# Patient Record
Sex: MALE | Race: WHITE | NOT HISPANIC OR LATINO | Employment: OTHER | URBAN - METROPOLITAN AREA
[De-identification: names, ages, dates, MRNs, and addresses within clinical notes are randomized per-mention and may not be internally consistent; named-entity substitution may affect disease eponyms.]

---

## 2019-08-08 ENCOUNTER — OFFICE VISIT (OUTPATIENT)
Dept: GASTROENTEROLOGY | Facility: CLINIC | Age: 79
End: 2019-08-08
Payer: MEDICARE

## 2019-08-08 VITALS
SYSTOLIC BLOOD PRESSURE: 140 MMHG | HEART RATE: 72 BPM | DIASTOLIC BLOOD PRESSURE: 84 MMHG | BODY MASS INDEX: 25.91 KG/M2 | WEIGHT: 171 LBS | HEIGHT: 68 IN

## 2019-08-08 DIAGNOSIS — K21.9 GASTROESOPHAGEAL REFLUX DISEASE WITHOUT ESOPHAGITIS: Primary | ICD-10-CM

## 2019-08-08 DIAGNOSIS — Z12.11 COLON CANCER SCREENING: ICD-10-CM

## 2019-08-08 PROCEDURE — 99213 OFFICE O/P EST LOW 20 MIN: CPT | Performed by: INTERNAL MEDICINE

## 2019-08-08 RX ORDER — PANTOPRAZOLE SODIUM 20 MG/1
20 TABLET, DELAYED RELEASE ORAL 2 TIMES DAILY
Qty: 180 TABLET | Refills: 12 | Status: SHIPPED | OUTPATIENT
Start: 2019-08-08 | End: 2020-08-17 | Stop reason: SDUPTHER

## 2019-08-08 RX ORDER — ESCITALOPRAM OXALATE 10 MG/1
10 TABLET ORAL DAILY
COMMUNITY

## 2019-08-08 RX ORDER — AMLODIPINE BESYLATE 5 MG/1
5 TABLET ORAL DAILY
COMMUNITY

## 2019-08-08 RX ORDER — PANTOPRAZOLE SODIUM 20 MG/1
20 TABLET, DELAYED RELEASE ORAL 2 TIMES DAILY
COMMUNITY
End: 2019-08-08 | Stop reason: SDUPTHER

## 2019-08-08 RX ORDER — ASPIRIN 81 MG/1
81 TABLET ORAL DAILY
COMMUNITY

## 2019-08-08 NOTE — PROGRESS NOTES
[address] [facility] - Outpatient Follow-up Note  Niko Pope [de-identified] y o  male MRN: [id number]  Encounter: [id number]    ASSESSMENT AND PLAN:      1  Gastroesophageal reflux disease without esophagitis  Feels better taking 40 mg of pantoprazole daily rather than 20 mg twice a day  Last EGD 2013  - pantoprazole (PROTONIX) 20 mg tablet; Take 1 tablet (20 mg total) by mouth 2 (two) times a day  Dispense: 180 tablet; Refill: 12    2  Colon cancer screening  Last colonoscopy 2011  Followup Appointment:  1 year  ______________________________________________________________________    Chief Complaint   Patient presents with    Routine follow up-GERD     HPI:  Patient returns today for follow-up from a GERD standpoint  He is doing well  He was taking pantoprazole 20 mg twice a day but thinks he feels better taking 40 mg once a day  He denies any dysphagia, odynophagia, or early satiety  Denies any heartburn or indigestion  His bowels are normal   His weight is stable  Her cardiac standpoint he is stable    From prostate cancer standpoint he is concerned this PSA is increasing in May need to go back on medication    Historical Information   Past Medical History:   Diagnosis Date    Colon cancer screening 8/8/2019    Last colonoscopy 2011    Coronary artery disease     GERD (gastroesophageal reflux disease)     Last EGD 2013    Hypertension     Prostate cancer [facility])      Past Surgical History:   Procedure Laterality Date    CORONARY ARTERY BYPASS GRAFT  01/2015    EGD  10/16/2013    PROSTATECTOMY      SKIN CANCER EXCISION       Social History     Substance and Sexual Activity   Alcohol Use Yes     Social History     Substance and Sexual Activity   Drug Use Not on file     Social History     Tobacco Use   Smoking Status Never Smoker   Smokeless Tobacco Never Used     Family History   Problem Relation Age of Onset    Breast cancer Sister     Parkinsonism Sister     Colon polyps Neg Hx     Colon cancer Neg Hx          Current Outpatient Medications:     amLODIPine (NORVASC) 5 mg tablet    aspirin (ECOTRIN LOW STRENGTH) 81 mg EC tablet    escitalopram (LEXAPRO) 10 mg tablet    pantoprazole (PROTONIX) 20 mg tablet  No Known Allergies    10 Point REVIEW OF SYSTEMS IS OTHERWISE NEGATIVE  PHYSICAL EXAM:    Blood pressure 140/84, pulse 72, height 5' 8" (1 727 m), weight 77 6 kg (171 lb)  Body mass index is 26 kg/m²  General Appearance:  Alert, cooperative, no distress  HEENT:  Normocephalic, atraumatic, anicteric  Neck: Supple, symmetrical, trachea midline  Lungs: Clear to auscultation bilaterally; no rales, rhonchi or wheezing; respirations unlabored   Heart: Regular rate and rhythm; no murmur, rub, or gallop  Abdomen:   Soft, non-tender, non-distended; normal bowel sounds; no masses, no organomegaly   Rectal:  Deferred   Extremities:  No cyanosis, clubbing or edema   Skin:  No jaundice, rashes, or lesions   Lymph nodes: No palpable cervical lymphadenopathy     Lab Results:   None recent     Radiology Results:   No results found

## 2019-08-08 NOTE — LETTER
August 8, 2019     Arletta Schaumann, [address] [facility] [address]  [address] [facility]    Patient: Maryanne Guillen   YOB: 1940   Date of Visit: 8/8/2019       Dear Dr Titus Dance: Thank you for referring Maryanne Guillen to me for evaluation  Below are my notes for this consultation  If you have questions, please do not hesitate to call me  I look forward to following your patient along with you  Sincerely,        Shireen Good MD        CC: MD Shireen Ca MD  8/8/2019  4:30 PM  Incomplete  [facility] [employer] [facility] - Outpatient Follow-up [facility]  Maryanne Guillen [de-identified] y o  male MRN: [id number]  Encounter: [id number]    ASSESSMENT AND PLAN:      1  Gastroesophageal reflux disease without esophagitis  Feels better taking 40 mg of pantoprazole daily rather than 20 mg twice a day  Last EGD 2013  - pantoprazole (PROTONIX) 20 mg tablet; Take 1 tablet (20 mg total) by mouth 2 (two) times a day  Dispense: 180 tablet; Refill: 12    2  Colon cancer screening  Last colonoscopy 2011  Followup Appointment:  1 year  ______________________________________________________________________    Chief Complaint   Patient presents with    Routine follow up-GERD     HPI:  Patient returns today for follow-up from a GERD standpoint  He is doing well  He was taking pantoprazole 20 mg twice a day but thinks he feels better taking 40 mg once a day  He denies any dysphagia, odynophagia, or early satiety  Denies any heartburn or indigestion  His bowels are normal   His weight is stable  Her cardiac standpoint he is stable    From prostate cancer standpoint he is concerned this PSA is increasing in May need to go back on medication    Historical Information   Past Medical History:   Diagnosis Date    Colon cancer screening 8/8/2019    Last colonoscopy 2011    Coronary artery disease     GERD (gastroesophageal reflux disease)     Last EGD 2013    Hypertension  Prostate cancer [facility])      Past Surgical History:   Procedure Laterality Date    CORONARY ARTERY BYPASS GRAFT  01/2015    EGD  10/16/2013    PROSTATECTOMY      SKIN CANCER EXCISION       Social History     Substance and Sexual Activity   Alcohol Use Yes     Social History     Substance and Sexual Activity   Drug Use Not on file     Social History     Tobacco Use   Smoking Status Never Smoker   Smokeless Tobacco Never Used     Family History   Problem Relation Age of Onset    Breast cancer Sister     Parkinsonism Sister     Colon polyps Neg Hx     Colon cancer Neg Hx          Current Outpatient Medications:     amLODIPine (NORVASC) 5 mg tablet    aspirin (ECOTRIN LOW STRENGTH) 81 mg EC tablet    escitalopram (LEXAPRO) 10 mg tablet    pantoprazole (PROTONIX) 20 mg tablet  No Known Allergies    10 Point REVIEW OF SYSTEMS IS OTHERWISE NEGATIVE  PHYSICAL EXAM:    Blood pressure 140/84, pulse 72, height 5' 8" (1 727 m), weight 77 6 kg (171 lb)  Body mass index is 26 kg/m²  General Appearance:  Alert, cooperative, no distress  HEENT:  Normocephalic, atraumatic, anicteric  Neck: Supple, symmetrical, trachea midline  Lungs: Clear to auscultation bilaterally; no rales, rhonchi or wheezing; respirations unlabored   Heart: Regular rate and rhythm; no murmur, rub, or gallop  Abdomen:   Soft, non-tender, non-distended; normal bowel sounds; no masses, no organomegaly   Rectal:  Deferred   Extremities:  No cyanosis, clubbing or edema   Skin:  No jaundice, rashes, or lesions   Lymph nodes: No palpable cervical lymphadenopathy     Lab Results:   None recent     Radiology Results:   No results found

## 2020-08-17 ENCOUNTER — OFFICE VISIT (OUTPATIENT)
Dept: GASTROENTEROLOGY | Facility: CLINIC | Age: 80
End: 2020-08-17
Payer: MEDICARE

## 2020-08-17 VITALS
BODY MASS INDEX: 24.1 KG/M2 | HEART RATE: 70 BPM | WEIGHT: 159 LBS | TEMPERATURE: 97.5 F | DIASTOLIC BLOOD PRESSURE: 68 MMHG | SYSTOLIC BLOOD PRESSURE: 126 MMHG | HEIGHT: 68 IN

## 2020-08-17 DIAGNOSIS — I25.10 CORONARY ARTERY DISEASE INVOLVING NATIVE CORONARY ARTERY OF NATIVE HEART WITHOUT ANGINA PECTORIS: ICD-10-CM

## 2020-08-17 DIAGNOSIS — Z12.11 COLON CANCER SCREENING: ICD-10-CM

## 2020-08-17 DIAGNOSIS — K21.9 GASTROESOPHAGEAL REFLUX DISEASE WITHOUT ESOPHAGITIS: Primary | ICD-10-CM

## 2020-08-17 PROCEDURE — 99213 OFFICE O/P EST LOW 20 MIN: CPT | Performed by: INTERNAL MEDICINE

## 2020-08-17 RX ORDER — ATORVASTATIN CALCIUM 80 MG/1
80 TABLET, FILM COATED ORAL DAILY
COMMUNITY

## 2020-08-17 RX ORDER — CLOPIDOGREL BISULFATE 75 MG/1
75 TABLET ORAL DAILY
COMMUNITY
End: 2021-09-10

## 2020-08-17 RX ORDER — PANTOPRAZOLE SODIUM 20 MG/1
20 TABLET, DELAYED RELEASE ORAL 2 TIMES DAILY
Qty: 180 TABLET | Refills: 12 | Status: SHIPPED | OUTPATIENT
Start: 2020-08-17 | End: 2021-09-10

## 2020-08-17 RX ORDER — TEMAZEPAM 7.5 MG/1
15 CAPSULE ORAL
COMMUNITY

## 2020-08-17 RX ORDER — METOPROLOL SUCCINATE 25 MG/1
25 TABLET, EXTENDED RELEASE ORAL DAILY
COMMUNITY

## 2020-08-17 RX ORDER — BICALUTAMIDE 50 MG/1
50 TABLET, FILM COATED ORAL DAILY
COMMUNITY

## 2020-08-17 RX ORDER — ISOSORBIDE MONONITRATE 30 MG/1
30 TABLET, EXTENDED RELEASE ORAL DAILY
COMMUNITY

## 2020-08-17 NOTE — LETTER
August 17, 2020     Saima Robert, [address] [facility] [address] [facility]    Patient: Marilynn Reaves   YOB: 1940   Date of Visit: 8/17/2020       Dear Dr Kiara Hua: Thank you for referring Marilynn Reaves to me for evaluation  Below are my notes for this consultation  If you have questions, please do not hesitate to call me  I look forward to following your patient along with you  Sincerely,        Bird Lang MD        CC: MD Bird Duarte MD  8/17/2020 11:47 AM  Sign when Signing Visit  [address] [facility] - Outpatient Follow-up Note  Marilynn Reaves 79 y o  male MRN: [id number]  Encounter: [id number]    ASSESSMENT AND PLAN:      1  Gastroesophageal reflux disease without esophagitis  Doing well taking pantoprazole two 20 mg pills in the morning   - pantoprazole (PROTONIX) 20 mg tablet; Take 1 tablet (20 mg total) by mouth 2 (two) times a day  Dispense: 180 tablet; Refill: 12    2  Colon cancer screening  Last colonoscopy 2011  Can discuss the pros and cons of repeating next year     3  Coronary artery disease involving native coronary artery of native heart without angina pectoris  History of lad stent in the past   Recent MI in May  Treating medically      Followup Appointment:  1 year  ______________________________________________________________________    Chief Complaint   Patient presents with    Follow-up     GERD     HPI:  The patient returns today for follow-up  He reports in the spring had an episode of chest pain was diagnosed with a a myocardial infarction  Catheterization showed small vessel disease with nothing amenable to stenting  He has been medically treated  From a GERD standpoint he is doing well  When I last saw him he switched his pantoprazole from 20 mg b i d  To 40 mg once a day  He likes having to 20 mg pills in case he needs to take an extra 1 if he has breakthrough symptoms at night  However with the pantoprazole he denies any heartburn or reflux  Denies any dysphagia, odynophagia, early satiety  He denies any GI bleeding  His bowels are normal except when he eats too much fruit/vegetables and then they become a little loose    Historical Information   Past Medical History:   Diagnosis Date    Colon cancer screening 8/8/2019    Last colonoscopy 2011    Coronary artery disease     GERD (gastroesophageal reflux disease)     Last EGD 2013    Hypertension     Prostate cancer [facility])      Past Surgical History:   Procedure Laterality Date    CORONARY ARTERY BYPASS GRAFT  01/2015    EGD  10/16/2013    PROSTATECTOMY      SKIN CANCER EXCISION       Social History     Substance and Sexual Activity   Alcohol Use Yes     Social History     Substance and Sexual Activity   Drug Use Not on file     Social History     Tobacco Use   Smoking Status Never Smoker   Smokeless Tobacco Never Used     Family History   Problem Relation Age of Onset    Breast cancer Sister     Parkinsonism Sister     Colon polyps Neg Hx     Colon cancer Neg Hx          Current Outpatient Medications:     amLODIPine (NORVASC) 5 mg tablet    aspirin (ECOTRIN LOW STRENGTH) 81 mg EC tablet    atorvastatin (LIPITOR) 80 mg tablet    bicalutamide (CASODEX) 50 mg tablet    clopidogrel (PLAVIX) 75 mg tablet    escitalopram (LEXAPRO) 10 mg tablet    isosorbide mononitrate (IMDUR) 30 mg 24 hr tablet    metoprolol succinate (TOPROL-XL) 25 mg 24 hr tablet    pantoprazole (PROTONIX) 20 mg tablet    temazepam (RESTORIL) 7 5 mg capsule  No Known Allergies  Reviewed medications and allergies and updated as indicated    PHYSICAL EXAM:    Blood pressure 126/68, pulse 70, temperature 97 5 °F (36 4 °C), height 5' 8" (1 727 m), weight 72 1 kg (159 lb)  Body mass index is 24 18 kg/m²  General Appearance: NAD, cooperative, alert  Eyes: Anicteric, PERRLA, EOMI  ENT:  Normocephalic, atraumatic, normal mucosa      Neck:  Supple, symmetrical, trachea midline  Resp:  Clear to auscultation bilaterally; no rales, rhonchi or wheezing; respirations unlabored   CV:  S1 S2, Regular rate and rhythm; no murmur, rub, or gallop  GI:  Soft, non-tender, non-distended; normal bowel sounds; no masses, no organomegaly   Rectal: Deferred  Musculoskeletal: No cyanosis, clubbing or edema  Normal ROM  Skin:  No jaundice, rashes, or lesions   Heme/Lymph: No palpable cervical lymphadenopathy  Psych: Normal affect, good eye contact  Neuro: No gross deficits, AAOx3    Lab Results:   None recently    Radiology Results:   No results found

## 2020-08-17 NOTE — PROGRESS NOTES
[facility] - Outpatient Follow-up Note  Carmen Levin 79 y o  male MRN: [id number]  Encounter: [id number]    ASSESSMENT AND PLAN:      1  Gastroesophageal reflux disease without esophagitis  Doing well taking pantoprazole two 20 mg pills in the morning   - pantoprazole (PROTONIX) 20 mg tablet; Take 1 tablet (20 mg total) by mouth 2 (two) times a day  Dispense: 180 tablet; Refill: 12    2  Colon cancer screening  Last colonoscopy 2011  Can discuss the pros and cons of repeating next year     3  Coronary artery disease involving native coronary artery of native heart without angina pectoris  History of lad stent in the past   Recent MI in May  Treating medically      Followup Appointment:  1 year  ______________________________________________________________________    Chief Complaint   Patient presents with    Follow-up     GERD     HPI:  The patient returns today for follow-up  He reports in the spring had an episode of chest pain was diagnosed with a a myocardial infarction  Catheterization showed small vessel disease with nothing amenable to stenting  He has been medically treated  From a GERD standpoint he is doing well  When I last saw him he switched his pantoprazole from 20 mg b i d  To 40 mg once a day  He likes having to 20 mg pills in case he needs to take an extra 1 if he has breakthrough symptoms at night  However with the pantoprazole he denies any heartburn or reflux  Denies any dysphagia, odynophagia, early satiety  He denies any GI bleeding    His bowels are normal except when he eats too much fruit/vegetables and then they become a little loose    Historical Information   Past Medical History:   Diagnosis Date    Colon cancer screening 8/8/2019    Last colonoscopy 2011    Coronary artery disease     GERD (gastroesophageal reflux disease)     Last EGD 2013    Hypertension     Prostate cancer [facility])      Past Surgical History:   Procedure Laterality Date    CORONARY ARTERY BYPASS GRAFT  01/2015    EGD  10/16/2013    PROSTATECTOMY      SKIN CANCER EXCISION       Social History     Substance and Sexual Activity   Alcohol Use Yes     Social History     Substance and Sexual Activity   Drug Use Not on file     Social History     Tobacco Use   Smoking Status Never Smoker   Smokeless Tobacco Never Used     Family History   Problem Relation Age of Onset    Breast cancer Sister     Parkinsonism Sister     Colon polyps Neg Hx     Colon cancer Neg Hx          Current Outpatient Medications:     amLODIPine (NORVASC) 5 mg tablet    aspirin (ECOTRIN LOW STRENGTH) 81 mg EC tablet    atorvastatin (LIPITOR) 80 mg tablet    bicalutamide (CASODEX) 50 mg tablet    clopidogrel (PLAVIX) 75 mg tablet    escitalopram (LEXAPRO) 10 mg tablet    isosorbide mononitrate (IMDUR) 30 mg 24 hr tablet    metoprolol succinate (TOPROL-XL) 25 mg 24 hr tablet    pantoprazole (PROTONIX) 20 mg tablet    temazepam (RESTORIL) 7 5 mg capsule  No Known Allergies  Reviewed medications and allergies and updated as indicated    PHYSICAL EXAM:    Blood pressure 126/68, pulse 70, temperature 97 5 °F (36 4 °C), height 5' 8" (1 727 m), weight 72 1 kg (159 lb)  Body mass index is 24 18 kg/m²  General Appearance: NAD, cooperative, alert  Eyes: Anicteric, PERRLA, EOMI  ENT:  Normocephalic, atraumatic, normal mucosa  Neck:  Supple, symmetrical, trachea midline  Resp:  Clear to auscultation bilaterally; no rales, rhonchi or wheezing; respirations unlabored   CV:  S1 S2, Regular rate and rhythm; no murmur, rub, or gallop  GI:  Soft, non-tender, non-distended; normal bowel sounds; no masses, no organomegaly   Rectal: Deferred  Musculoskeletal: No cyanosis, clubbing or edema  Normal ROM    Skin:  No jaundice, rashes, or lesions   Heme/Lymph: No palpable cervical lymphadenopathy  Psych: Normal affect, good eye contact  Neuro: No gross deficits, AAOx3    Lab Results:   None recently    Radiology Results:   No results found

## 2021-09-10 ENCOUNTER — OFFICE VISIT (OUTPATIENT)
Dept: GASTROENTEROLOGY | Facility: CLINIC | Age: 81
End: 2021-09-10
Payer: MEDICARE

## 2021-09-10 VITALS
HEIGHT: 68 IN | DIASTOLIC BLOOD PRESSURE: 60 MMHG | WEIGHT: 163 LBS | BODY MASS INDEX: 24.71 KG/M2 | SYSTOLIC BLOOD PRESSURE: 126 MMHG | HEART RATE: 59 BPM

## 2021-09-10 DIAGNOSIS — K21.9 GASTROESOPHAGEAL REFLUX DISEASE WITHOUT ESOPHAGITIS: Primary | ICD-10-CM

## 2021-09-10 DIAGNOSIS — Z12.11 COLON CANCER SCREENING: ICD-10-CM

## 2021-09-10 PROCEDURE — 99213 OFFICE O/P EST LOW 20 MIN: CPT | Performed by: INTERNAL MEDICINE

## 2021-09-10 RX ORDER — PANTOPRAZOLE SODIUM 40 MG/1
40 TABLET, DELAYED RELEASE ORAL DAILY
Qty: 90 TABLET | Refills: 5 | Status: SHIPPED | OUTPATIENT
Start: 2021-09-10

## 2021-09-10 NOTE — LETTER
September 10, 2021     Nirmal Ugalde, [address] [facility] [address]  [address] [facility]    Patient: Kenny Nino   YOB: 1940   Date of Visit: 9/10/2021       Dear Dr Joseph Doran: Thank you for referring Kenny Nino to me for evaluation  Below are my notes for this consultation  If you have questions, please do not hesitate to call me  I look forward to following your patient along with you  Sincerely,        Thaddeus Soto MD        CC: No Recipients  Thaddeus Soto MD  9/10/2021  9:03 AM  Sign when Signing Visit  [address] [facility] - Outpatient Follow-up Note  Kenny Nino 79 y o  male MRN: [id number]  Encounter: [id number]    ASSESSMENT AND PLAN:      1  Gastroesophageal reflux disease without esophagitis  Doing well on Protonix  Now taking  40 mg in the morning rather 20 mg twice a day  - pantoprazole (PROTONIX) 40 mg tablet; Take 1 tablet (40 mg total) by mouth daily  Dispense: 90 tablet; Refill: 5    2  Colon cancer screening  Last colonoscopy 2011  No plans to repeat secondary to age      Followup Appointment:   1 year  ______________________________________________________________________    Chief Complaint   Patient presents with    Annual Exam     GERD     HPI:   Patient is seen back in the office today  He is doing overall well from a GI and cardiac standpoint  He is off his Plavix now  He has been taking his Protonix 40 mg in the morning  He was taking 20 mg twice a day but was having trouble remembering taking the 2nd dose  He denies any heartburn or indigestion  Denies any dysphagia, odynophagia, early satiety  He is moving his bowels regularly  His weight is stable    He sold his house in his the process of moving to The [employer] of [address] [place] [address]   Past Medical History:   Diagnosis Date    Colon cancer screening 8/8/2019    Last colonoscopy 2011    Coronary artery disease     GERD (gastroesophageal reflux disease)     Last EGD 2013    Hypertension     Prostate cancer [facility])      Past Surgical History:   Procedure Laterality Date    COLONOSCOPY      CORONARY ARTERY BYPASS GRAFT  01/2015    EGD  10/16/2013    PROSTATECTOMY      SKIN CANCER EXCISION       Social History     Substance and Sexual Activity   Alcohol Use Yes     Social History     Substance and Sexual Activity   Drug Use Not on file     Social History     Tobacco Use   Smoking Status Never Smoker   Smokeless Tobacco Never Used     Family History   Problem Relation Age of Onset    Breast cancer Sister     Parkinsonism Sister     Colon polyps Neg Hx     Colon cancer Neg Hx          Current Outpatient Medications:     amLODIPine (NORVASC) 5 mg tablet    aspirin (ECOTRIN LOW STRENGTH) 81 mg EC tablet    atorvastatin (LIPITOR) 80 mg tablet    bicalutamide (CASODEX) 50 mg tablet    escitalopram (LEXAPRO) 10 mg tablet    isosorbide mononitrate (IMDUR) 30 mg 24 hr tablet    metoprolol succinate (TOPROL-XL) 25 mg 24 hr tablet    temazepam (RESTORIL) 7 5 mg capsule    pantoprazole (PROTONIX) 40 mg tablet  No Known Allergies  Reviewed medications and allergies and updated as indicated    PHYSICAL EXAM:    Blood pressure 126/60, pulse 59, height 5' 8" (1 727 m), weight 73 9 kg (163 lb)  Body mass index is 24 78 kg/m²  General Appearance: NAD, cooperative, alert  Eyes: Anicteric, PERRLA, EOMI  ENT:  Normocephalic, atraumatic, normal mucosa  Neck:  Supple, symmetrical, trachea midline  Resp:  Clear to auscultation bilaterally; no rales, rhonchi or wheezing; respirations unlabored   CV:  S1 S2, Regular rate and rhythm; no murmur, rub, or gallop  GI:  Soft, non-tender, non-distended; normal bowel sounds; no masses, no organomegaly   Rectal: Deferred  Musculoskeletal: No cyanosis, clubbing or edema  Normal ROM    Skin:  No jaundice, rashes, or lesions   Heme/Lymph: No palpable cervical lymphadenopathy  Psych: Normal affect, good eye contact  Neuro: No gross deficits, AAOx3    Lab Results:   None recently     Radiology Results:   No results found

## 2021-09-10 NOTE — PROGRESS NOTES
[address] [facility] - Outpatient Follow-up Note  Everardo Crews 79 y o  male MRN: [id number]  Encounter: [id number]    ASSESSMENT AND PLAN:      1  Gastroesophageal reflux disease without esophagitis  Doing well on Protonix  Now taking  40 mg in the morning rather 20 mg twice a day  - pantoprazole (PROTONIX) 40 mg tablet; Take 1 tablet (40 mg total) by mouth daily  Dispense: 90 tablet; Refill: 5    2  Colon cancer screening  Last colonoscopy 2011  No plans to repeat secondary to age      Followup Appointment:   1 year  ______________________________________________________________________    Chief Complaint   Patient presents with    Annual Exam     GERD     HPI:   Patient is seen back in the office today  He is doing overall well from a GI and cardiac standpoint  He is off his Plavix now  He has been taking his Protonix 40 mg in the morning  He was taking 20 mg twice a day but was having trouble remembering taking the 2nd dose  He denies any heartburn or indigestion  Denies any dysphagia, odynophagia, early satiety  He is moving his bowels regularly  His weight is stable    He sold his house in his the process of moving to The [employer] of [address] [place] [address]   Past Medical History:   Diagnosis Date    Colon cancer screening 8/8/2019    Last colonoscopy 2011    Coronary artery disease     GERD (gastroesophageal reflux disease)     Last EGD 2013    Hypertension     Prostate cancer [facility])      Past Surgical History:   Procedure Laterality Date    COLONOSCOPY      CORONARY ARTERY BYPASS GRAFT  01/2015    EGD  10/16/2013    PROSTATECTOMY      SKIN CANCER EXCISION       Social History     Substance and Sexual Activity   Alcohol Use Yes     Social History     Substance and Sexual Activity   Drug Use Not on file     Social History     Tobacco Use   Smoking Status Never Smoker   Smokeless Tobacco Never Used     Family History   Problem Relation Age of Onset    Breast cancer Sister     Parkinsonism Sister     Colon polyps Neg Hx     Colon cancer Neg Hx          Current Outpatient Medications:     amLODIPine (NORVASC) 5 mg tablet    aspirin (ECOTRIN LOW STRENGTH) 81 mg EC tablet    atorvastatin (LIPITOR) 80 mg tablet    bicalutamide (CASODEX) 50 mg tablet    escitalopram (LEXAPRO) 10 mg tablet    isosorbide mononitrate (IMDUR) 30 mg 24 hr tablet    metoprolol succinate (TOPROL-XL) 25 mg 24 hr tablet    temazepam (RESTORIL) 7 5 mg capsule    pantoprazole (PROTONIX) 40 mg tablet  No Known Allergies  Reviewed medications and allergies and updated as indicated    PHYSICAL EXAM:    Blood pressure 126/60, pulse 59, height 5' 8" (1 727 m), weight 73 9 kg (163 lb)  Body mass index is 24 78 kg/m²  General Appearance: NAD, cooperative, alert  Eyes: Anicteric, PERRLA, EOMI  ENT:  Normocephalic, atraumatic, normal mucosa  Neck:  Supple, symmetrical, trachea midline  Resp:  Clear to auscultation bilaterally; no rales, rhonchi or wheezing; respirations unlabored   CV:  S1 S2, Regular rate and rhythm; no murmur, rub, or gallop  GI:  Soft, non-tender, non-distended; normal bowel sounds; no masses, no organomegaly   Rectal: Deferred  Musculoskeletal: No cyanosis, clubbing or edema  Normal ROM  Skin:  No jaundice, rashes, or lesions   Heme/Lymph: No palpable cervical lymphadenopathy  Psych: Normal affect, good eye contact  Neuro: No gross deficits, AAOx3    Lab Results:   None recently     Radiology Results:   No results found

## 2022-09-15 ENCOUNTER — OFFICE VISIT (OUTPATIENT)
Dept: GASTROENTEROLOGY | Facility: CLINIC | Age: 82
End: 2022-09-15
Payer: MEDICARE

## 2022-09-15 VITALS
HEIGHT: 68 IN | SYSTOLIC BLOOD PRESSURE: 114 MMHG | BODY MASS INDEX: 25.31 KG/M2 | DIASTOLIC BLOOD PRESSURE: 62 MMHG | WEIGHT: 167 LBS | HEART RATE: 78 BPM

## 2022-09-15 DIAGNOSIS — Z12.11 COLON CANCER SCREENING: ICD-10-CM

## 2022-09-15 DIAGNOSIS — K21.9 GASTROESOPHAGEAL REFLUX DISEASE WITHOUT ESOPHAGITIS: Primary | ICD-10-CM

## 2022-09-15 DIAGNOSIS — K58.0 IRRITABLE BOWEL SYNDROME WITH DIARRHEA: ICD-10-CM

## 2022-09-15 PROCEDURE — 99213 OFFICE O/P EST LOW 20 MIN: CPT | Performed by: INTERNAL MEDICINE

## 2022-09-15 RX ORDER — PANTOPRAZOLE SODIUM 40 MG/1
40 TABLET, DELAYED RELEASE ORAL DAILY
Qty: 90 TABLET | Refills: 5 | Status: SHIPPED | OUTPATIENT
Start: 2022-09-15

## 2022-09-15 RX ORDER — DICYCLOMINE HYDROCHLORIDE 10 MG/1
10 CAPSULE ORAL EVERY 6 HOURS PRN
Qty: 10 CAPSULE | Refills: 5 | Status: SHIPPED | OUTPATIENT
Start: 2022-09-15

## 2022-09-15 NOTE — LETTER
September 15, 2022     Jailyn Mayers, [address] [facility] [address]  [address] [facility]    Patient: Batsheva Mcduffie   YOB: 1940   Date of Visit: 9/15/2022       Dear Dr Zeeshan Felix: Thank you for referring Batsheva Mcduffie to me for evaluation  Below are my notes for this consultation  If you have questions, please do not hesitate to call me  I look forward to following your patient along with you  Sincerely,        Ashley Juares MD        CC: MD Ashley Richards MD  9/15/2022  9:36 AM  Sign when Signing Visit  [facility] - Outpatient Follow-up Note  Batsheva Mcduffie 79 y o  male MRN: [id number]  Encounter: [id number]    ASSESSMENT AND PLAN:      1  Irritable bowel syndrome with diarrhea  Recent episode of crampy abdominal pain associated with diarrhea and nausea and vomiting  He believes it is flare of his irritable bowel syndrome  Admits he is under lot of stress at home  Can not completely exclude an episode of infectious gastroenteritis with nausea/vomiting, abdominal pain, and diarrhea  Improved at this point  - dicyclomine (BENTYL) 10 mg capsule; Take 1 capsule (10 mg total) by mouth every 6 (six) hours as needed (Abdominal cramps)  Dispense: 10 capsule; Refill: 5  - okay to use Imodium as needed    2  Gastroesophageal reflux disease without esophagitis  Doing well on pantoprazole once a day  - pantoprazole (PROTONIX) 40 mg tablet; Take 1 tablet (40 mg total) by mouth daily  Dispense: 90 tablet; Refill: 5    3  Colon cancer screening  Last colonoscopy 2011  No plans to recall secondary to age       Followup Appointment:  1 year  ______________________________________________________________________    Chief Complaint   Patient presents with    Yearly follow up-GERD     Is currently having issues with abd  Cramping and diarrhea       HPI:  Patient is seen back in the office today    From a GERD standpoint he is doing well   Continues take Protonix 40 mg daily and denies any significant heartburn or indigestion  Denies any dysphagia, odynophagia, early satiety  He reports earlier in the week had episodes of crampy abdominal pain associated diarrhea and nausea/vomiting  This lasted for several days  He backed office diet and seems to be getting better  He believes is related to his irritable bowel syndrome  He denies any GI bleeding  He denies any fevers or chills  Denies any exposure to anybody else sick  He is reluctant to take Imodium could he does not want to be constipated  He reports he has had similar episodes to this through the years which he attributes to his IBS    He does admit that he is under lot of stress as he is concerned about his wife's failing health    Historical Information   Past Medical History:   Diagnosis Date    Colon cancer screening 8/8/2019    Last colonoscopy 2011    Coronary artery disease     GERD (gastroesophageal reflux disease)     Last EGD 2013    Hypertension     Prostate cancer [facility])      Past Surgical History:   Procedure Laterality Date    COLONOSCOPY      CORONARY ARTERY BYPASS GRAFT  01/2015    EGD  10/16/2013    PROSTATECTOMY      SKIN CANCER EXCISION       Social History     Substance and Sexual Activity   Alcohol Use Not Currently    Comment: Social only     Social History     Substance and Sexual Activity   Drug Use Never     Social History     Tobacco Use   Smoking Status Never Smoker   Smokeless Tobacco Never Used     Family History   Problem Relation Age of Onset    Breast cancer Sister     Parkinsonism Sister     Colon polyps Neg Hx     Colon cancer Neg Hx          Current Outpatient Medications:     amLODIPine (NORVASC) 5 mg tablet    aspirin (ECOTRIN LOW STRENGTH) 81 mg EC tablet    atorvastatin (LIPITOR) 80 mg tablet    bicalutamide (CASODEX) 50 mg tablet    dicyclomine (BENTYL) 10 mg capsule    escitalopram (LEXAPRO) 10 mg tablet    isosorbide mononitrate (IMDUR) 30 mg 24 hr tablet    metoprolol succinate (TOPROL-XL) 25 mg 24 hr tablet    pantoprazole (PROTONIX) 40 mg tablet    temazepam (RESTORIL) 7 5 mg capsule  No Known Allergies  Reviewed medications and allergies and updated as indicated    PHYSICAL EXAM:    Blood pressure 114/62, pulse 78, height 5' 8" (1 727 m), weight 75 8 kg (167 lb)  Body mass index is 25 39 kg/m²  General Appearance: NAD, cooperative, alert  Eyes: Anicteric, PERRLA, EOMI  ENT:  Normocephalic, atraumatic, normal mucosa  Neck:  Supple, symmetrical, trachea midline  Resp:  Clear to auscultation bilaterally; no rales, rhonchi or wheezing; respirations unlabored   CV:  S1 S2, Regular rate and rhythm; no murmur, rub, or gallop  GI:  Soft, non-tender, non-distended; normal bowel sounds; no masses, no organomegaly   Rectal: Deferred  Musculoskeletal: No cyanosis, clubbing or edema  Normal ROM  Skin:  No jaundice, rashes, or lesions   Heme/Lymph: No palpable cervical lymphadenopathy  Psych: Normal affect, good eye contact  Neuro: No gross deficits, AAOx3    Lab Results:   None recently     Radiology Results:   No results found  Answers for HPI/ROS submitted by the patient on 9/8/2022  Chronicity: recurrent  Onset quality: gradual  Frequency: intermittently  Episode duration: 51473 hours  Progression since onset: resolved  Pain location: generalized abdominal region  Pain - numeric: 3/10  Pain quality: cramping  Radiates to: suprapubic region, perineum  anorexia: No  arthralgias: No  belching: No  constipation: No  diarrhea: Yes  dysuria: No  fever: No  flatus: Yes  frequency: No  hematochezia: No  hematuria: No  melena: No  myalgias: No  nausea:  No  weight loss: No  vomiting: No  Aggravated by: nothing  Relieved by: certain positions  Diagnostic workup: lower endoscopy

## 2022-09-15 NOTE — PROGRESS NOTES
[facility] - Outpatient Follow-up Note  Kayli Finley 79 y o  male MRN: [id number]  Encounter: [id number]    ASSESSMENT AND PLAN:      1  Irritable bowel syndrome with diarrhea  Recent episode of crampy abdominal pain associated with diarrhea and nausea and vomiting  He believes it is flare of his irritable bowel syndrome  Admits he is under lot of stress at home  Can not completely exclude an episode of infectious gastroenteritis with nausea/vomiting, abdominal pain, and diarrhea  Improved at this point  - dicyclomine (BENTYL) 10 mg capsule; Take 1 capsule (10 mg total) by mouth every 6 (six) hours as needed (Abdominal cramps)  Dispense: 10 capsule; Refill: 5  - okay to use Imodium as needed    2  Gastroesophageal reflux disease without esophagitis  Doing well on pantoprazole once a day  - pantoprazole (PROTONIX) 40 mg tablet; Take 1 tablet (40 mg total) by mouth daily  Dispense: 90 tablet; Refill: 5    3  Colon cancer screening  Last colonoscopy 2011  No plans to recall secondary to age       Followup Appointment:  1 year  ______________________________________________________________________    Chief Complaint   Patient presents with    Yearly follow up-GERD     Is currently having issues with abd  Cramping and diarrhea       HPI:  Patient is seen back in the office today  From a GERD standpoint he is doing well  Continues take Protonix 40 mg daily and denies any significant heartburn or indigestion  Denies any dysphagia, odynophagia, early satiety  He reports earlier in the week had episodes of crampy abdominal pain associated diarrhea and nausea/vomiting  This lasted for several days  He backed office diet and seems to be getting better  He believes is related to his irritable bowel syndrome  He denies any GI bleeding  He denies any fevers or chills  Denies any exposure to anybody else sick    He is reluctant to take Imodium could he does not want to be constipated  He reports he has had similar episodes to this through the years which he attributes to his IBS  He does admit that he is under lot of stress as he is concerned about his wife's failing health    Historical Information   Past Medical History:   Diagnosis Date    Colon cancer screening 8/8/2019    Last colonoscopy 2011    Coronary artery disease     GERD (gastroesophageal reflux disease)     Last EGD 2013    Hypertension     Prostate cancer [facility])      Past Surgical History:   Procedure Laterality Date    COLONOSCOPY      CORONARY ARTERY BYPASS GRAFT  01/2015    EGD  10/16/2013    PROSTATECTOMY      SKIN CANCER EXCISION       Social History     Substance and Sexual Activity   Alcohol Use Not Currently    Comment: Social only     Social History     Substance and Sexual Activity   Drug Use Never     Social History     Tobacco Use   Smoking Status Never Smoker   Smokeless Tobacco Never Used     Family History   Problem Relation Age of Onset    Breast cancer Sister     Parkinsonism Sister     Colon polyps Neg Hx     Colon cancer Neg Hx          Current Outpatient Medications:     amLODIPine (NORVASC) 5 mg tablet    aspirin (ECOTRIN LOW STRENGTH) 81 mg EC tablet    atorvastatin (LIPITOR) 80 mg tablet    bicalutamide (CASODEX) 50 mg tablet    dicyclomine (BENTYL) 10 mg capsule    escitalopram (LEXAPRO) 10 mg tablet    isosorbide mononitrate (IMDUR) 30 mg 24 hr tablet    metoprolol succinate (TOPROL-XL) 25 mg 24 hr tablet    pantoprazole (PROTONIX) 40 mg tablet    temazepam (RESTORIL) 7 5 mg capsule  No Known Allergies  Reviewed medications and allergies and updated as indicated    PHYSICAL EXAM:    Blood pressure 114/62, pulse 78, height 5' 8" (1 727 m), weight 75 8 kg (167 lb)  Body mass index is 25 39 kg/m²  General Appearance: NAD, cooperative, alert  Eyes: Anicteric, PERRLA, EOMI  ENT:  Normocephalic, atraumatic, normal mucosa      Neck:  Supple, symmetrical, trachea midline  Resp: Clear to auscultation bilaterally; no rales, rhonchi or wheezing; respirations unlabored   CV:  S1 S2, Regular rate and rhythm; no murmur, rub, or gallop  GI:  Soft, non-tender, non-distended; normal bowel sounds; no masses, no organomegaly   Rectal: Deferred  Musculoskeletal: No cyanosis, clubbing or edema  Normal ROM  Skin:  No jaundice, rashes, or lesions   Heme/Lymph: No palpable cervical lymphadenopathy  Psych: Normal affect, good eye contact  Neuro: No gross deficits, AAOx3    Lab Results:   None recently     Radiology Results:   No results found  Answers for HPI/ROS submitted by the patient on 9/8/2022  Chronicity: recurrent  Onset quality: gradual  Frequency: intermittently  Episode duration: 77006 hours  Progression since onset: resolved  Pain location: generalized abdominal region  Pain - numeric: 3/10  Pain quality: cramping  Radiates to: suprapubic region, perineum  anorexia: No  arthralgias: No  belching: No  constipation: No  diarrhea: Yes  dysuria: No  fever: No  flatus: Yes  frequency: No  hematochezia: No  hematuria: No  melena: No  myalgias: No  nausea:  No  weight loss: No  vomiting: No  Aggravated by: nothing  Relieved by: certain positions  Diagnostic workup: lower endoscopy

## 2022-09-15 NOTE — PATIENT INSTRUCTIONS
Continue pantoprazole once a day  Use Bentyl as needed for abdominal cramps and spasms    Follow-up office visit 1 year

## 2023-07-12 ENCOUNTER — NURSE TRIAGE (OUTPATIENT)
Age: 83
End: 2023-07-12

## 2023-07-12 NOTE — TELEPHONE ENCOUNTER
Reason for Disposition  • Diarrhea is a chronic symptom (recurrent or ongoing AND lasting > 4 weeks)    Protocols used: DIARRHEA-ADULT-OH     I spoke with patient, he has experienced similar episodes in the past, most noticeable when eating onions (which he avoids as best as he can). Symptoms seem to be occurring more frequently but intermittent. He does have dicyclomine and Imodium but he states once he has explosive BM after abdominal cramping he has no reason to take the Imodium. Symptoms usually occur 1-2 hours after eating, no matter what he ingests. He was diagnosed with CLL in March of this year and follows with [place]. No new medications, no recent antibiotics, no sick contacts. He is currently scheduled for follow up in September but requests earlier (on waitlist). He is leaving on vacation in 2 weeks. I advised liquid diet or BRAT, increasing fiber (foods or supplement), starting Pepto Bismol 1-2 tabs up to QID (noted stool may darken in color). Please review and advise. If any orders [facility] on file confirmed.

## 2023-07-12 NOTE — TELEPHONE ENCOUNTER
Agree with recommendations as below. Continue to avoid trigger foods. Keeping food diary may be helpful to identify other potential triggers. He may continue to use imodium as needed as long as diarrhea is non-bloody and no worsening abdominal pain. Thanks!

## 2023-07-12 NOTE — TELEPHONE ENCOUNTER
Regarding: explosive diarrhea, cramping  ----- Message from Theresa Donato sent at 7/12/2023 11:07 AM EDT -----  Patient of Dr. Viktoriya Underwood, he is experiencing cramping and explosive diarrhea when he eats.  Patient has been scheduled for the first available at [facility] on 9/14

## 2023-07-13 NOTE — TELEPHONE ENCOUNTER
I spoke with patient and reviewed note to keep food diary, continue meds as instructed but if blood noted in stool or increasing abd pain patient should stop Imodium and call the office.

## 2023-07-19 ENCOUNTER — TELEPHONE (OUTPATIENT)
Dept: GASTROENTEROLOGY | Facility: CLINIC | Age: 83
End: 2023-07-19

## 2023-07-19 NOTE — TELEPHONE ENCOUNTER
Left a voice mail about moving his appt up from 09/14 to 07/21 with Dr Kike Cardoso, in [facility], at 4:30 pm

## 2023-07-19 NOTE — TELEPHONE ENCOUNTER
Patient returned a call from someone in the office regarding an availability for 7- but will keep the initial appt scheduled 9-

## 2023-10-16 ENCOUNTER — OFFICE VISIT (OUTPATIENT)
Dept: GASTROENTEROLOGY | Facility: CLINIC | Age: 83
End: 2023-10-16
Payer: MEDICARE

## 2023-10-16 VITALS
HEART RATE: 73 BPM | HEIGHT: 68 IN | WEIGHT: 173.2 LBS | DIASTOLIC BLOOD PRESSURE: 79 MMHG | BODY MASS INDEX: 26.25 KG/M2 | SYSTOLIC BLOOD PRESSURE: 136 MMHG

## 2023-10-16 DIAGNOSIS — K21.9 GASTROESOPHAGEAL REFLUX DISEASE WITHOUT ESOPHAGITIS: ICD-10-CM

## 2023-10-16 DIAGNOSIS — Z12.11 COLON CANCER SCREENING: ICD-10-CM

## 2023-10-16 DIAGNOSIS — K58.0 IRRITABLE BOWEL SYNDROME WITH DIARRHEA: Primary | ICD-10-CM

## 2023-10-16 PROCEDURE — 99213 OFFICE O/P EST LOW 20 MIN: CPT | Performed by: INTERNAL MEDICINE

## 2023-10-16 RX ORDER — DICYCLOMINE HYDROCHLORIDE 10 MG/1
10 CAPSULE ORAL EVERY 6 HOURS PRN
Qty: 30 CAPSULE | Refills: 5 | Status: SHIPPED | OUTPATIENT
Start: 2023-10-16

## 2023-10-16 RX ORDER — PANTOPRAZOLE SODIUM 40 MG/1
40 TABLET, DELAYED RELEASE ORAL DAILY
Qty: 90 TABLET | Refills: 5 | Status: SHIPPED | OUTPATIENT
Start: 2023-10-16

## 2023-10-16 NOTE — LETTER
October 16, 2023     [address]  [address] [facility]    Patient: Stefani Lee   YOB: 1940   Date of Visit: 10/16/2023       Dear Dr. Governor Baird: Thank you for referring Stefani Lee to me for evaluation. Below are my notes for this consultation. If you have questions, please do not hesitate to call me. I look forward to following your patient along with you. Sincerely,        Janel Minor. Carmenza Delgado MD        CC: Fredrich Lye, DO Rik Blizzard, MD Maritza Roes. Carmenza Delgado MD  10/16/2023 10:09 AM  Sign when Signing Visit  [facility] - Outpatient Follow-up Note  Stefani Lee 79 y.o. male MRN: [id number]  Encounter: [id number]    ASSESSMENT AND PLAN:      1. Irritable bowel syndrome with diarrhea  Intermittent crampy abdominal pain associate with diarrhea. Under a lot of stress secondary to social situations. Bentyl seems to be helpful when he takes it  - dicyclomine (BENTYL) 10 mg capsule; Take 1 capsule (10 mg total) by mouth every 6 (six) hours as needed (Abdominal cramps)  Dispense: 30 capsule; Refill: 5  -Stress management    2. Gastroesophageal reflux disease without esophagitis  Doing well on Protonix 40 mg daily. Does have some nighttime breakthrough symptoms. - pantoprazole (PROTONIX) 40 mg tablet; Take 1 tablet (40 mg total) by mouth daily  Dispense: 90 tablet; Refill: 5  -Okay to use Pepcid AC as needed at bedtime    3. Colon cancer screening  Last colonoscopy 2011. No plans to repeat      Followup Appointment: 1 year  ______________________________________________________________________    Chief Complaint   Patient presents with   • GERD   • Irritable Bowel Syndrome     Diarrhea and constipation. Abdominal cramping and pain. HPI: Patient is seen back in the office today. From a GERD standpoint he is doing relatively well. He takes Protonix 40 mg once a day in the morning.   He does report sometimes having breakthrough heartburn. He denies any dysphagia, odynophagia, early satiety. He denies any chest pain. From an irritable bowel standpoint he has had some issues. Is under a lot of stress caring for his wife who is developing dementia. When I last saw him I gave him a prescription for Bentyl to use as needed. He has only used it intermittently but thinks it helps. Several months ago he had issues with worsening diarrhea. He correlated with this with starting Prevagen. Once he stopped this his bowels are back to normal.  He reports he usually moves his bowels every day. If he eats the wrong things some crampy lower quadrant abdominal pain associated with 1 or 2 loose stools. This is relatively intermittent.     Historical Information  Past Medical History:   Diagnosis Date   • CLL (chronic lymphocytic leukemia) ([address])    • Colon cancer screening 08/08/2019    Last colonoscopy 2011   • Coronary artery disease    • GERD (gastroesophageal reflux disease)     Last EGD 2013   • Hypertension    • Prostate cancer [facility])      Past Surgical History:   Procedure Laterality Date   • COLONOSCOPY     • CORONARY ARTERY BYPASS GRAFT  01/2015   • EGD  10/16/2013   • PROSTATECTOMY     • SKIN CANCER EXCISION       Social History     Substance and Sexual Activity   Alcohol Use Not Currently    Comment: Social only     Social History     Substance and Sexual Activity   Drug Use Never     Social History     Tobacco Use   Smoking Status Never   Smokeless Tobacco Never     Family History   Problem Relation Age of Onset   • Breast cancer Sister    • Parkinsonism Sister    • Colon polyps Neg Hx    • Colon cancer Neg Hx          Current Outpatient Medications:   •  amLODIPine (NORVASC) 5 mg tablet  •  aspirin (ECOTRIN LOW STRENGTH) 81 mg EC tablet  •  atorvastatin (LIPITOR) 80 mg tablet  •  bicalutamide (CASODEX) 50 mg tablet  •  dicyclomine (BENTYL) 10 mg capsule  •  escitalopram (LEXAPRO) 10 mg tablet  •  isosorbide mononitrate (IMDUR) 30 mg 24 hr tablet  •  metoprolol succinate (TOPROL-XL) 25 mg 24 hr tablet  •  pantoprazole (PROTONIX) 40 mg tablet  •  temazepam (RESTORIL) 7.5 mg capsule  No Known Allergies  Reviewed medications and allergies and updated as indicated    PHYSICAL EXAM:    Blood pressure 136/79, pulse 73, height 5' 8" (1.727 m), weight 78.6 kg (173 lb 3.2 oz). Body mass index is 26.33 kg/m². General Appearance: NAD, cooperative, alert  Eyes: Anicteric, PERRLA, EOMI  ENT:  Normocephalic, atraumatic, normal mucosa. Neck:  Supple, symmetrical, trachea midline  Resp:  Clear to auscultation bilaterally; no rales, rhonchi or wheezing; respirations unlabored   CV:  S1 S2, Regular rate and rhythm; no murmur, rub, or gallop. GI:  Soft, non-tender, non-distended; normal bowel sounds; no masses, no organomegaly   Rectal: Deferred  Musculoskeletal: No cyanosis, clubbing or edema. Normal ROM. Skin:  No jaundice, rashes, or lesions   Heme/Lymph: No palpable cervical lymphadenopathy  Psych: Normal affect, good eye contact  Neuro: No gross deficits, AAOx3    Lab Results:   None recently      Radiology Results:   No results found.

## 2023-10-16 NOTE — PATIENT INSTRUCTIONS
Continue pantoprazole 40 mg daily. Okay to use Pepcid AC at bedtime if needed. Continue to watch stress and trigger foods. Okay to use Bentyl up to 4 times a day as needed.   Follow-up office visit 1 year as long as you are doing all right but if any problems or issues let me know

## 2023-10-16 NOTE — PROGRESS NOTES
[facility] - Outpatient Follow-up Note  Jeremy Moreno 79 y.o. male MRN: [id number]  Encounter: [id number]    ASSESSMENT AND PLAN:      1. Irritable bowel syndrome with diarrhea  Intermittent crampy abdominal pain associate with diarrhea. Under a lot of stress secondary to social situations. Bentyl seems to be helpful when he takes it  - dicyclomine (BENTYL) 10 mg capsule; Take 1 capsule (10 mg total) by mouth every 6 (six) hours as needed (Abdominal cramps)  Dispense: 30 capsule; Refill: 5  -Stress management    2. Gastroesophageal reflux disease without esophagitis  Doing well on Protonix 40 mg daily. Does have some nighttime breakthrough symptoms. - pantoprazole (PROTONIX) 40 mg tablet; Take 1 tablet (40 mg total) by mouth daily  Dispense: 90 tablet; Refill: 5  -Okay to use Pepcid AC as needed at bedtime    3. Colon cancer screening  Last colonoscopy 2011. No plans to repeat      Followup Appointment: 1 year  ______________________________________________________________________    Chief Complaint   Patient presents with    GERD    Irritable Bowel Syndrome     Diarrhea and constipation. Abdominal cramping and pain. HPI: Patient is seen back in the office today. From a GERD standpoint he is doing relatively well. He takes Protonix 40 mg once a day in the morning. He does report sometimes having breakthrough heartburn. He denies any dysphagia, odynophagia, early satiety. He denies any chest pain. From an irritable bowel standpoint he has had some issues. Is under a lot of stress caring for his wife who is developing dementia. When I last saw him I gave him a prescription for Bentyl to use as needed. He has only used it intermittently but thinks it helps. Several months ago he had issues with worsening diarrhea. He correlated with this with starting Prevagen.   Once he stopped this his bowels are back to normal.  He reports he usually moves his bowels every day.  If he eats the wrong things some crampy lower quadrant abdominal pain associated with 1 or 2 loose stools. This is relatively intermittent. Historical Information   Past Medical History:   Diagnosis Date    CLL (chronic lymphocytic leukemia) ([address])     Colon cancer screening 08/08/2019    Last colonoscopy 2011    Coronary artery disease     GERD (gastroesophageal reflux disease)     Last EGD 2013    Hypertension     Prostate cancer [facility])      Past Surgical History:   Procedure Laterality Date    COLONOSCOPY      CORONARY ARTERY BYPASS GRAFT  01/2015    EGD  10/16/2013    PROSTATECTOMY      SKIN CANCER EXCISION       Social History     Substance and Sexual Activity   Alcohol Use Not Currently    Comment: Social only     Social History     Substance and Sexual Activity   Drug Use Never     Social History     Tobacco Use   Smoking Status Never   Smokeless Tobacco Never     Family History   Problem Relation Age of Onset    Breast cancer Sister     Parkinsonism Sister     Colon polyps Neg Hx     Colon cancer Neg Hx          Current Outpatient Medications:     amLODIPine (NORVASC) 5 mg tablet    aspirin (ECOTRIN LOW STRENGTH) 81 mg EC tablet    atorvastatin (LIPITOR) 80 mg tablet    bicalutamide (CASODEX) 50 mg tablet    dicyclomine (BENTYL) 10 mg capsule    escitalopram (LEXAPRO) 10 mg tablet    isosorbide mononitrate (IMDUR) 30 mg 24 hr tablet    metoprolol succinate (TOPROL-XL) 25 mg 24 hr tablet    pantoprazole (PROTONIX) 40 mg tablet    temazepam (RESTORIL) 7.5 mg capsule  No Known Allergies  Reviewed medications and allergies and updated as indicated    PHYSICAL EXAM:    Blood pressure 136/79, pulse 73, height 5' 8" (1.727 m), weight 78.6 kg (173 lb 3.2 oz). Body mass index is 26.33 kg/m². General Appearance: NAD, cooperative, alert  Eyes: Anicteric, PERRLA, EOMI  ENT:  Normocephalic, atraumatic, normal mucosa.     Neck:  Supple, symmetrical, trachea midline  Resp:  Clear to auscultation bilaterally; no rales, rhonchi or wheezing; respirations unlabored   CV:  S1 S2, Regular rate and rhythm; no murmur, rub, or gallop. GI:  Soft, non-tender, non-distended; normal bowel sounds; no masses, no organomegaly   Rectal: Deferred  Musculoskeletal: No cyanosis, clubbing or edema. Normal ROM. Skin:  No jaundice, rashes, or lesions   Heme/Lymph: No palpable cervical lymphadenopathy  Psych: Normal affect, good eye contact  Neuro: No gross deficits, AAOx3    Lab Results:   None recently      Radiology Results:   No results found.

## 2024-02-21 PROBLEM — Z12.11 COLON CANCER SCREENING: Status: RESOLVED | Noted: 2019-08-08 | Resolved: 2024-02-21

## 2024-03-22 ENCOUNTER — HOSPITAL ENCOUNTER (OUTPATIENT)
Dept: HOSPITAL 99 - CATH | Age: 84
LOS: 1 days | Discharge: HOME | End: 2024-03-23
Payer: MEDICARE

## 2024-03-22 VITALS — RESPIRATION RATE: 10 BRPM | SYSTOLIC BLOOD PRESSURE: 136 MMHG | DIASTOLIC BLOOD PRESSURE: 77 MMHG

## 2024-03-22 VITALS — DIASTOLIC BLOOD PRESSURE: 66 MMHG | SYSTOLIC BLOOD PRESSURE: 127 MMHG | RESPIRATION RATE: 19 BRPM

## 2024-03-22 VITALS — RESPIRATION RATE: 13 BRPM | SYSTOLIC BLOOD PRESSURE: 119 MMHG | DIASTOLIC BLOOD PRESSURE: 70 MMHG

## 2024-03-22 VITALS — BODY MASS INDEX: 23.5 KG/M2 | BODY MASS INDEX: 25.6 KG/M2

## 2024-03-22 VITALS — DIASTOLIC BLOOD PRESSURE: 112 MMHG | SYSTOLIC BLOOD PRESSURE: 135 MMHG

## 2024-03-22 VITALS — RESPIRATION RATE: 14 BRPM

## 2024-03-22 VITALS — SYSTOLIC BLOOD PRESSURE: 144 MMHG | DIASTOLIC BLOOD PRESSURE: 81 MMHG

## 2024-03-22 VITALS — DIASTOLIC BLOOD PRESSURE: 102 MMHG | SYSTOLIC BLOOD PRESSURE: 129 MMHG

## 2024-03-22 VITALS — RESPIRATION RATE: 13 BRPM

## 2024-03-22 VITALS — DIASTOLIC BLOOD PRESSURE: 100 MMHG | SYSTOLIC BLOOD PRESSURE: 135 MMHG

## 2024-03-22 VITALS — SYSTOLIC BLOOD PRESSURE: 135 MMHG | DIASTOLIC BLOOD PRESSURE: 100 MMHG | RESPIRATION RATE: 16 BRPM

## 2024-03-22 VITALS — RESPIRATION RATE: 19 BRPM | DIASTOLIC BLOOD PRESSURE: 92 MMHG | SYSTOLIC BLOOD PRESSURE: 135 MMHG

## 2024-03-22 VITALS — RESPIRATION RATE: 16 BRPM

## 2024-03-22 VITALS — RESPIRATION RATE: 14 BRPM | SYSTOLIC BLOOD PRESSURE: 135 MMHG | DIASTOLIC BLOOD PRESSURE: 78 MMHG

## 2024-03-22 VITALS
DIASTOLIC BLOOD PRESSURE: 77 MMHG | SYSTOLIC BLOOD PRESSURE: 135 MMHG | RESPIRATION RATE: 18 BRPM | OXYGEN SATURATION: 2 %

## 2024-03-22 VITALS — SYSTOLIC BLOOD PRESSURE: 138 MMHG | DIASTOLIC BLOOD PRESSURE: 96 MMHG

## 2024-03-22 VITALS — DIASTOLIC BLOOD PRESSURE: 71 MMHG | SYSTOLIC BLOOD PRESSURE: 118 MMHG

## 2024-03-22 VITALS — SYSTOLIC BLOOD PRESSURE: 149 MMHG | DIASTOLIC BLOOD PRESSURE: 73 MMHG

## 2024-03-22 VITALS — SYSTOLIC BLOOD PRESSURE: 149 MMHG | DIASTOLIC BLOOD PRESSURE: 67 MMHG

## 2024-03-22 VITALS — RESPIRATION RATE: 18 BRPM

## 2024-03-22 DIAGNOSIS — Z95.1: ICD-10-CM

## 2024-03-22 DIAGNOSIS — Z79.82: ICD-10-CM

## 2024-03-22 DIAGNOSIS — Z79.02: ICD-10-CM

## 2024-03-22 DIAGNOSIS — R94.39: ICD-10-CM

## 2024-03-22 DIAGNOSIS — I25.110: Primary | ICD-10-CM

## 2024-03-22 DIAGNOSIS — R07.9: ICD-10-CM

## 2024-03-22 DIAGNOSIS — E78.5: ICD-10-CM

## 2024-03-22 DIAGNOSIS — Z79.899: ICD-10-CM

## 2024-03-22 DIAGNOSIS — I10: ICD-10-CM

## 2024-03-22 LAB
ACT BLD: 274 SECONDS (ref 116–155)
ACT BLD: > 397 SECONDS (ref 116–155)

## 2024-03-22 PROCEDURE — C9600 PERC DRUG-EL COR STENT SING: HCPCS

## 2024-03-22 PROCEDURE — C1769 GUIDE WIRE: HCPCS

## 2024-03-22 PROCEDURE — C1725 CATH, TRANSLUMIN NON-LASER: HCPCS

## 2024-03-22 PROCEDURE — C1894 INTRO/SHEATH, NON-LASER: HCPCS

## 2024-03-22 PROCEDURE — C1874 STENT, COATED/COV W/DEL SYS: HCPCS

## 2024-03-22 RX ADMIN — ATORVASTATIN CALCIUM 80 MG: 80 TABLET, FILM COATED ORAL at 17:21

## 2024-03-22 RX ADMIN — SODIUM CHLORIDE 229 ML: 900 INJECTION, SOLUTION INTRAVENOUS at 12:40

## 2024-03-22 NOTE — ITS.CL.ANGIO
"Cath Lab - Angioplasty"~"Angioplasty"~"Procedure Report: "~"LEFT HEART CATHETERIZATION"~"Date of Procedure: March 22, 2024"~"Procedures performed: "~"1: Coronary angiography"~"2: Left ventricular hemodynamic assessment"~"3: Left internal mammary artery bypass graft angiography"~"4: Percutaneous coronary intervention of the left circumflex with placement of 2 drug-eluting stent (2.25 x 15 15 mm overlapping with a 2.5 x 12 mm Florence drug-eluting stents both postdilated at high pressure with a 2.5 mm diameter noncompliant "~"balloon)"~"Primary Care Physician: Dr. Vesna Bautista"~"Primary Cardiologist: Myself"~"INDICATION: The patient is an 83-year-old man with past medical history of coronary artery disease status post CABG presents with crescendo angina."~"ACCESS: The patient was prepped and draped in usual sterile fashion.  A 6 [place] sheath was placed in the left radial artery using the Seldinger over the wire technique. "~"HEMODYNAMIC FINDINGS (mmHg):"~"LV(s/d,EDP): 130/13, 22"~"Ao(s/d,m): 130/63, 93"~"ANGIOGRAPHIC FINDINGS:"~"Single-plane Left Ventriculography in HEBERT Projection: Normal LVEF by recent echo "~"Coronary Angiography:"~"Dominance: Right"~"Left Main: Medium caliber.  No angiographic stenosis however clear dampening with 6 [place] catheter engagement suggests some degree of diffuse disease."~"Left Anterior Descending: The LAD is occluded after the takeoff of a previously placed stent and appears unchanged with small vessel disease distally to prior angiography in 2020.  It does gives rise to 3 large septal branches.  The proximal vessel "~"is patent."~"Left Circumflex: The left circumflex is a medium caliber nondominant system that gives rise to 2 major obtuse marginal branches that are relatively large.  The circumflex takeoff has 180 degree acute bend and has diffuse 40 to 50% disease.  OM1 has "~"ostial 40 to 50% disease with proximal 50% disease.  This appears unchanged from prior angiography in 2020.  The distal vessel is tortuous but angiographically normal with normal flow.  The second obtuse marginal branch has a preocclusive 95% mid "~"stenosis followed by a second 90% stenosis with MARGARITA II distal flow."~"Right Coronary: The right coronary artery is a relatively large caliber dominant vessel the proximal right coronary artery has new 50% ulcerated disease which appears to be a healed ruptured plaque.  This is followed by a smooth mid 40% lesion in "~"the mid artery followed by moderate 30% disease of the acute margin.  There is a high PDA takeoff which is relatively small vessel.  The distal circumflex gives rise to 3 main posterior left ventricular branches.  The first branch has moderate "~"proximal disease and the largest distal PLV branch has a hazy 60% proximal stenosis which is new.  All vessels have MARGARITA-3 distal flow."~"LIMA to LAD: Widely patent graft and anastomosis.  The distal LAD has severe 80% small vessel disease at the apex."~"Percutaneous Coronary Intervention (PCI): In light of the above angiographic findings I elected to proceed with PCI.  The patient was pretreated with aspirin.  Unfractionated heparin was given.  A 6 [place] XB 3.5 guiding catheter was used to engage "~"the left main.  A BMW wire was successfully advanced with some difficulty around the tortuous circumflex takeoff and advanced distally into the major OM branch.  A 2.0 x 12 mm balloon was used to dilate the 2 preocclusive lesions.  This restored "~"antegrade MARGARITA-3 flow.  Next a 6 [place] Guideliner was used to deliver a 2.25 x 15 mm Florence drug-eluting stent distally which was deployed at 14 roopa.  I specifically chose to treat the lesions with 2 shorter stents as I was not confident that a "~"longer stent would track around the proximal tortuosity.  Next a 2.5 x 12 mm Florence drug-eluting stent was deployed proximally in overlapping fashion at 14 roopa.  The entire stented segment was postdilated at 16 roopa with a 2.5 mm diameter noncompliant "~"balloon."~"FINAL RESULT: 0% in-stent residual stenosis with excellent angiographic result and MARGARITA-3 flow in all vessels."~"Fluoroscopy Time (min): 14.9"~"Radiation Dose (mGy): 926"~"DAP (Gy.cm2): 63"~"Closure device: None.  A TR band was applied for hemostasis at the left wrist."~"Complications: None."~"ASSESSMENT:"~"1: Successful PCI of the culprit OM with placement of 2 drug-eluting stents as described above."~"2: Progressive nonobstructive disease in the right coronary artery as described above."~"3: Widely patent LIMA to LAD with distal small vessel disease as described above."~"CONCLUSIONS and RECOMMENDATIONS: "~"1: Routine post drug-eluting stent with dual antiplatelet therapy using aspirin Plavix for a minimum of a year."~"2: Medical therapy for coronary artery disease and hypertension."~"EDWIN Perea M.D."~"Copy to: Dr. Vesna Bautista"

## 2024-03-22 NOTE — CM
Chart reviewed.   Patient is independent of ADLS, lives with his wife in a apartment at [facility], located within [facility], elevator access, 0 DME.  Patient is not current with VN.  Plan is for the patient to return home.  CM to follow

## 2024-03-23 VITALS — DIASTOLIC BLOOD PRESSURE: 64 MMHG | SYSTOLIC BLOOD PRESSURE: 139 MMHG

## 2024-03-23 VITALS — DIASTOLIC BLOOD PRESSURE: 77 MMHG | SYSTOLIC BLOOD PRESSURE: 137 MMHG

## 2024-03-23 VITALS — RESPIRATION RATE: 18 BRPM

## 2024-03-23 VITALS — DIASTOLIC BLOOD PRESSURE: 74 MMHG | SYSTOLIC BLOOD PRESSURE: 122 MMHG

## 2024-03-23 VITALS — RESPIRATION RATE: 16 BRPM

## 2024-03-23 LAB
BUN SERPL-MCNC: 18 MG/DL (ref 9–20)
CALCIUM SERPL-MCNC: 9.3 MG/DL (ref 8.4–10.2)
CHLORIDE SERPL-SCNC: 105 MMOL/L (ref 98–107)
CO2 SERPL-SCNC: 27 MMOL/L (ref 22–30)
EGFR: > 60
ERYTHROCYTE [DISTWIDTH] IN BLOOD BY AUTOMATED COUNT: 12.9 % (ref 11.5–14.5)
ESTIMATED CREATININE CLEARANCE: 53 ML/MIN
GLUCOSE SERPL-MCNC: 95 MG/DL (ref 70–99)
HCT VFR BLD AUTO: 41.5 % (ref 39–52)
HGB BLD-MCNC: 13.8 G/DL (ref 13–18)
MCHC RBC AUTO-ENTMCNC: 33.3 G/DL (ref 33–37)
MCV RBC AUTO: 97.4 FL (ref 80–94)
PLATELET # BLD AUTO: 108 10^3/UL (ref 130–400)
POTASSIUM SERPL-SCNC: 5 MMOL/L (ref 3.5–5.1)
SODIUM SERPL-SCNC: 140 MMOL/L (ref 135–145)

## 2024-03-23 RX ADMIN — AMLODIPINE BESYLATE 5 MG: 5 TABLET ORAL at 08:06

## 2024-03-23 RX ADMIN — PANTOPRAZOLE SODIUM 40 MG: 40 TABLET, DELAYED RELEASE ORAL at 08:06

## 2024-03-23 RX ADMIN — CLOPIDOGREL BISULFATE 75 MG: 75 TABLET ORAL at 08:06

## 2024-03-23 RX ADMIN — EZETIMIBE 10 MG: 10 TABLET ORAL at 08:05

## 2024-03-23 RX ADMIN — BICALUTAMIDE 50 MG: 50 TABLET ORAL at 08:06

## 2024-03-23 RX ADMIN — ISOSORBIDE MONONITRATE 30 MG: 30 TABLET, EXTENDED RELEASE ORAL at 08:06

## 2024-03-23 RX ADMIN — ASPIRIN 81 MG: 81 TABLET, COATED ORAL at 08:06

## 2024-03-23 RX ADMIN — METOPROLOL SUCCINATE 25 MG: 25 TABLET, FILM COATED, EXTENDED RELEASE ORAL at 08:06

## 2024-03-23 NOTE — W.PN.CARDCBS
"Addendum entered and electronically signed by Adi Munoz,   03/23/24 08:46: "~"I saw and examined the patient."~"The Advanced Practice Professional's note was reviewed and I agree with the note."~"Comment:  "~"Resting comfortably, no complaints. No CP, sob, palpitations, syncope, or weakness."~"GEN: NAD. AAOx3"~"HEENT: Wearing hearing aids and glasses. EOMI. MMM."~"LUNGS: No audible wheeze"~"CV: Sinus on tele"~"ABD: ND"~"EXT: left radial dressing removed to reveal ecchymosis without hematoma. +2 radial pulse without thrill."~"NEURO: Gross non-focal"~"SKIN: No rash"~"PCP: Dr. Vesna Bautista"~"Cardiology: Dr. Perea"~"Impression:"~"Transfer from [facility] to  for cath 3/22/24"~"CAD"~"	s/p mid LAD PTCA and stent 6/1/05"~"	stable CAD by cath at  11/10/05"~"	medical management of LAD 9/2013"~"	s/p CABG with NAPIER to LAD at  1/2015"~"	s/p overlapping 2.25 mm and 2.5 mm [place] KAM to Circ 3/22/24HTN"~"Hyperlipidemia"~"Echo 3/21/24: EF 55-60%, no evidence of AS, mild MR, no significant change compared to 5/30/20"~"Plan:"~"-Patient was transferred from [facility] to  for cardiac cath 3/22/24. Patient had chest pain during stress test 3/20/24 and describes chest pain that he couldn't tell if it was GERD or angina. He now feels better after PCI 3/22/24."~"-Left radial site inspected and he has ecchymosis without hematoma. Limb restrictions reviewed."~"-Aspiring continued and Plavix added."~"-Platelet count was 120 at [facility] 3/22/24 and is 108 at  on 3/23/24. Patient follows with Dr. Piedra. Also, WBC was 57 at [facility] and is 55 at , patient updated with both lab values."~"-New to atorvastatin 80 mg daily. Check CVE. None found in available records."~"-Outpatient dose of Zetia 10 mg daily continued."~"-Anticipate d/c to home 3/23/24; stable for DC"~"Original Note:"~"Today's Communication / Plan"~"-"~"-: "~"D/C to home today"~"Impression / Plan"~"-"~"-: "~"PCP: Dr. Vesna Bautista"~"Cardiology: Dr. Perea"~"Impression:"~"Transfer from [facility] to  for cath 3/22/24"~"CAD"~"	s/p mid LAD PTCA and stent 6/1/05"~"	stable CAD by cath at  11/10/05"~"	medical management of LAD 9/2013"~"	s/p CABG with NAPIER to LAD at  1/2015"~"	s/p overlapping 2.25 mm and 2.5 mm [place] KAM to Circ 3/22/24"~"HTN"~"Hyperlipidemia"~"Echo 3/21/24: EF 55-60%, no evidence of AS, mild MR, no significant change compared to 5/30/20"~"Plan:"~"-Patient was transferred from [facility] to  for cardiac cath 3/22/24. Patient had chest pain during stress test 3/20/24 and describes chest pain that he couldn't tell if it was GERD or angina. He now feels better after PCI 3/22/24."~"-Left radial site inspected and he has ecchymosis without hematoma. Limb restrictions reviewed."~"-Aspiring continued and Plavix added. "~"-Platelet count was 120 at [facility] 3/22/24 and is 108 at  on 3/23/24. Patient follows with Dr. Piedra. Also, WBC was 57 at [facility] and is 55 at , patient updated with both lab values."~"-New to atorvastatin 80 mg daily. Check CVE. None found in available records."~"-Outpatient dose of Zetia 10 mg daily continued."~"-Anticipate d/c to home 3/23/24"~"Progress Note - Cardiologist"~"Subjective"~"-: "~"Date of Service:  March 23, 2024"~"He feels well, GERD symptoms prior to admission have improved"~"Objective"~"Labs: "~"03/23/24 04:54 "~"03/23/24 04:54 "~"Labs"~"Hgb	 13.8 g/dL (13.0-18.0)  	03/23/24  04:54    "~"Hct	 41.5 % (39.0-52.0)  	03/23/24  04:54    "~"Plt Count	 108 10^3/uL (130-400)  L 	03/23/24  04:54    "~"Sodium	 140 mmol/L (135-145)  	03/23/24  04:54    "~"Potassium	 5.0 mmol/L (3.5-5.1)  	03/23/24  04:54    "~"BUN	 18 mg/dl (9-20)  	03/23/24  04:54    "~"Creatinine	 1.0 mg/dL (0.7-1.3)  	03/23/24  04:54    "~"Glucose	 95 mg/dl (70-99)  	03/23/24  04:54    "~"Vital Signs and I&O: "~"Vital Signs"~"Temp	Pulse	Resp	BP	Pulse Ox"~" 97.8 F 	 66 	 18 	 118/71 	 95 "~" 03/23/24 06:50	 03/23/24 03:00	 03/23/24 06:50	 03/22/24 22:44	 03/23/24 07:08"~"Vital Signs"~"Temp	Pulse	Resp	BP	Pulse Ox"~" 97.8 F 	 66 	 18 	 118/71 	 95 "~" 03/23/24 06:50	 03/23/24 03:00	 03/23/24 06:50	 03/22/24 22:44	 03/23/24 07:08"~"Intake & Output"~"	03/21/24	03/22/24	03/23/24	03/24/24"~"	06:59	06:59	06:59	06:59"~"Intake Total			574 / 574	"~"Output Total			1400 / 1400	"~"Balance			-826 / -826	"~"Physical Exam"~"Physical Exam"~"-: "~"GEN: NAD. AAOx3"~"HEENT: Wearing hearing aids and glasses. EOMI. MMM. "~"LUNGS: No audible wheeze"~"CV: Sinus on tele"~"ABD: ND"~"EXT: Right radial dressing removed to reveal ecchymosis without hematoma. +2 radial pulse without thrill. "~"NEURO: Gross non-focal"~"SKIN: No rash"

## 2024-03-23 NOTE — W.DS.TRANS
"DC Summary - Transcription"~"-"~"Discharge Instructions: "~"Discharge Diagnosis/Procedures	Coronary Artery Disease s/p PCI/stent x 2 to    	"~"                              	Circumflex artery                               	"~"Diet                          	Low Cholesterol                                 	"~"Activity                      	Other activity                                  	"~"Driving Restrictions          	No driving for 24 hours                         	"~"Bathing Restrictions          	OK to Shower                                    	"~"Other Services                	Cardiac Rehab                                   	"~"Instructions:       "~"Stand-Alone Forms:  DC Instructions- Cath/EP Lab"~"                    DC Inst - Same Day PCI"~"Changes to Home Medications: Yes"~"Discharge Medications: "~"DC Medications w/original date entered in [employer]"~"amlodipine 5 mg tablet 5 mg PO DAILY 01/06/15 "~"aspirin 81 mg tablet,delayed release 81 mg PO DAILY 01/06/15 "~"atorvastatin 80 mg tablet 80 mg PO QPM #30 tabs 03/22/24 "~"bicalutamide 50 mg tablet (Casodex) 50 mg PO DAILY 03/22/24 "~"clopidogrel 75 mg tablet 75 mg PO DAILY #30 tabs 03/22/24 "~"ezetimibe 10 mg tablet (Zetia) 10 mg PO DAILY 03/22/24 "~"isosorbide mononitrate 30 mg tablet,extended release 24 hr 30 mg PO DAILY 03/22/24 "~"metoprolol succinate 25 mg tablet,extended release 24 hr 25 mg PO DAILY 03/22/24 "~"pantoprazole 40 mg tablet,delayed release 40 mg PO DAILY 03/22/24 "~"Home Medication Changes  "~"New to atorvastatin and Plavix"~"Pending Results: No"

## 2024-10-16 ENCOUNTER — OFFICE VISIT (OUTPATIENT)
Dept: GASTROENTEROLOGY | Facility: CLINIC | Age: 84
End: 2024-10-16
Payer: MEDICARE

## 2024-10-16 VITALS
BODY MASS INDEX: 24.44 KG/M2 | SYSTOLIC BLOOD PRESSURE: 128 MMHG | DIASTOLIC BLOOD PRESSURE: 76 MMHG | HEIGHT: 69 IN | WEIGHT: 165 LBS

## 2024-10-16 DIAGNOSIS — K58.0 IRRITABLE BOWEL SYNDROME WITH DIARRHEA: ICD-10-CM

## 2024-10-16 DIAGNOSIS — I25.10 CORONARY ARTERY DISEASE INVOLVING NATIVE CORONARY ARTERY OF NATIVE HEART WITHOUT ANGINA PECTORIS: ICD-10-CM

## 2024-10-16 DIAGNOSIS — C91.10 CLL (CHRONIC LYMPHOCYTIC LEUKEMIA) (HCC): ICD-10-CM

## 2024-10-16 DIAGNOSIS — K21.9 GASTROESOPHAGEAL REFLUX DISEASE WITHOUT ESOPHAGITIS: Primary | ICD-10-CM

## 2024-10-16 PROCEDURE — 99213 OFFICE O/P EST LOW 20 MIN: CPT | Performed by: INTERNAL MEDICINE

## 2024-10-16 PROCEDURE — G2211 COMPLEX E/M VISIT ADD ON: HCPCS | Performed by: INTERNAL MEDICINE

## 2024-10-16 RX ORDER — EVOLOCUMAB 140 MG/ML
INJECTION, SOLUTION SUBCUTANEOUS
COMMUNITY
Start: 2024-10-05

## 2024-10-16 RX ORDER — EZETIMIBE 10 MG/1
TABLET ORAL
COMMUNITY

## 2024-10-16 RX ORDER — PANTOPRAZOLE SODIUM 40 MG/1
40 TABLET, DELAYED RELEASE ORAL DAILY
Qty: 90 TABLET | Refills: 5 | Status: SHIPPED | OUTPATIENT
Start: 2024-10-16

## 2024-10-16 RX ORDER — CLOPIDOGREL BISULFATE 75 MG/1
TABLET ORAL
COMMUNITY

## 2024-10-16 NOTE — PROGRESS NOTES
[facility] - Outpatient Follow-up Note  Kody Perea 84 y.o. male MRN: [id number]  Encounter: [id number]    ASSESSMENT AND PLAN:      1. Gastroesophageal reflux disease without esophagitis  Denies any heartburn or indigestion.  - pantoprazole (PROTONIX) 40 mg tablet; Take 1 tablet (40 mg total) by mouth daily  Dispense: 90 tablet; Refill: 5    2. Irritable bowel syndrome with diarrhea  Stress related but also some component of lactose intolerance.  Uses Bentyl intermittently.  Doing much better now sticking to a lactose-free diet and using Lactaid supplements    3. Coronary artery disease involving native coronary artery of native heart without angina pectoris  Had stents and cardiac bypass in the past.  Had 2 stents placed in the spring.  On Plavix..  Followed by Dr. Mijares    4. CLL (chronic lymphocytic leukemia) ([facility])  Followed by Dr. Nguyen      Follow up appointment: 1 year    _______________________      Chief Complaint   Patient presents with    Follow-up     Pt states he is doing well, no concerns.       HPI:   Patient is a 84 y.o. male with a significant PMH of GERD and IBS presenting for follow up.  He reports from GI standpoint he is doing well.  He takes Protonix 40 mg daily.  He denies any heartburn or indigestion denies dysphagia, odynophagia, early satiety.  Bowels are stable.  From a diarrhea standpoint he is doing well.  He uses Bentyl as needed.  He reports he had less stress in his life as his wife's condition is stabilized.  He does report that he discovered that he has some component of lactose intolerance.  He is now using almond milk and taking Lactaid supplements which seemed to help.    Historical Information   Past Medical History:   Diagnosis Date    CLL (chronic lymphocytic leukemia) ([facility])     Colon cancer screening 08/08/2019    Last colonoscopy 2011    Coronary artery disease     GERD (gastroesophageal reflux disease)     Last EGD 2013     "Hypertension     Prostate cancer (HCC)      Past Surgical History:   Procedure Laterality Date    COLONOSCOPY      CORONARY ARTERY BYPASS GRAFT  01/2015    EGD  10/16/2013    PROSTATECTOMY      SKIN CANCER EXCISION       Social History     Substance and Sexual Activity   Alcohol Use Not Currently    Comment: Social only     Social History     Substance and Sexual Activity   Drug Use Never     Social History     Tobacco Use   Smoking Status Never    Passive exposure: Never   Smokeless Tobacco Never     Family History   Problem Relation Age of Onset    Breast cancer Sister     Parkinsonism Sister     Colon polyps Neg Hx     Colon cancer Neg Hx          Current Outpatient Medications:     amLODIPine (NORVASC) 5 mg tablet    aspirin (ECOTRIN LOW STRENGTH) 81 mg EC tablet    bicalutamide (CASODEX) 50 mg tablet    clopidogrel (PLAVIX) 75 mg tablet    ezetimibe (ZETIA) 10 mg tablet    isosorbide mononitrate (IMDUR) 30 mg 24 hr tablet    metoprolol succinate (TOPROL-XL) 25 mg 24 hr tablet    pantoprazole (PROTONIX) 40 mg tablet    Repatha SureClick 140 MG/ML SOAJ    atorvastatin (LIPITOR) 80 mg tablet    dicyclomine (BENTYL) 10 mg capsule    escitalopram (LEXAPRO) 10 mg tablet  Allergies   Allergen Reactions    Niacin Headache    Cefepime Other (See Comments)    Oxycodone Delirium     Reviewed medications and allergies and updated as indicated    PHYSICAL EXAM:    Blood pressure 128/76, height 5' 9\" (1.753 m), weight 74.8 kg (165 lb). Body mass index is 24.37 kg/m².  General Appearance: NAD, cooperative, alert  Eyes: Anicteric  Chest: Clear to auscultation  Heart: Regular rate and rhythm   GI:  Soft, non-tender, non-distended; normal bowel sounds; no masses, no organomegaly   Rectal: Deferred  Musculoskeletal: No edema.  Skin:  No jaundice    Lab Results:   None recently    Radiology Results:   No results found.      "

## 2025-03-05 ENCOUNTER — HOSPITAL ENCOUNTER (OUTPATIENT)
Dept: HOSPITAL 99 - CATH | Age: 85
Discharge: ANOTHER HOSPITAL | End: 2025-03-05
Payer: MEDICARE

## 2025-03-05 VITALS — RESPIRATION RATE: 21 BRPM | DIASTOLIC BLOOD PRESSURE: 54 MMHG | SYSTOLIC BLOOD PRESSURE: 117 MMHG

## 2025-03-05 VITALS — DIASTOLIC BLOOD PRESSURE: 58 MMHG | SYSTOLIC BLOOD PRESSURE: 112 MMHG | RESPIRATION RATE: 18 BRPM

## 2025-03-05 VITALS — RESPIRATION RATE: 16 BRPM | SYSTOLIC BLOOD PRESSURE: 121 MMHG | DIASTOLIC BLOOD PRESSURE: 48 MMHG

## 2025-03-05 VITALS — RESPIRATION RATE: 28 BRPM | SYSTOLIC BLOOD PRESSURE: 114 MMHG | DIASTOLIC BLOOD PRESSURE: 59 MMHG

## 2025-03-05 VITALS — OXYGEN SATURATION: 2 % | DIASTOLIC BLOOD PRESSURE: 65 MMHG | RESPIRATION RATE: 17 BRPM

## 2025-03-05 VITALS — DIASTOLIC BLOOD PRESSURE: 54 MMHG | RESPIRATION RATE: 18 BRPM | SYSTOLIC BLOOD PRESSURE: 117 MMHG

## 2025-03-05 VITALS — RESPIRATION RATE: 20 BRPM

## 2025-03-05 VITALS — DIASTOLIC BLOOD PRESSURE: 50 MMHG | RESPIRATION RATE: 20 BRPM | SYSTOLIC BLOOD PRESSURE: 109 MMHG

## 2025-03-05 VITALS — SYSTOLIC BLOOD PRESSURE: 110 MMHG | DIASTOLIC BLOOD PRESSURE: 56 MMHG | RESPIRATION RATE: 22 BRPM

## 2025-03-05 VITALS — DIASTOLIC BLOOD PRESSURE: 53 MMHG | SYSTOLIC BLOOD PRESSURE: 108 MMHG | RESPIRATION RATE: 16 BRPM

## 2025-03-05 VITALS — DIASTOLIC BLOOD PRESSURE: 59 MMHG | SYSTOLIC BLOOD PRESSURE: 117 MMHG | RESPIRATION RATE: 17 BRPM

## 2025-03-05 VITALS — BODY MASS INDEX: 24.1 KG/M2

## 2025-03-05 VITALS — RESPIRATION RATE: 17 BRPM

## 2025-03-05 VITALS — RESPIRATION RATE: 18 BRPM

## 2025-03-05 VITALS — RESPIRATION RATE: 23 BRPM

## 2025-03-05 DIAGNOSIS — C91.10: ICD-10-CM

## 2025-03-05 DIAGNOSIS — R07.89: ICD-10-CM

## 2025-03-05 DIAGNOSIS — Z95.5: ICD-10-CM

## 2025-03-05 DIAGNOSIS — I10: ICD-10-CM

## 2025-03-05 DIAGNOSIS — I25.10: Primary | ICD-10-CM

## 2025-03-05 LAB — TROPONIN I SERPL-MCNC: < 0.012 NG/ML

## 2025-03-05 PROCEDURE — 99152 MOD SED SAME PHYS/QHP 5/>YRS: CPT

## 2025-03-05 PROCEDURE — 99153 MOD SED SAME PHYS/QHP EA: CPT

## 2025-03-05 NOTE — ITS.CL.CATH
"Cath Lab - Catheterization"~"Cardiac Catheterization"~"Procedure Report: "~"LEFT HEART CATHETERIZATION"~" "~"Date of Procedure: March 5, 2025"~" "~"Referring: Tobin Stacy MD"~" "~"PROCEDURES: "~"1.  Left heart catheterization, coronary angiogram."~"2.  Selective graft angiography."~"3.  Ultrasound-guided access"~" "~"INDICATION: Concern for unstable angina"~" "~"ACCESS: Left radial artery, 6 [place] sheath, under ultrasound-guided"~"Ultrasound was utilized for vascular access.  The radial artery was visualized under ultrasound, and the vessel was patent and pulsatile.  An image was stored permanently in the patient's medical record.  Under direct ultrasound guidance, a 6 [place]ian "~"sheath was inserted into the artery using a micropuncture kit through a modified Seldinger technique."~" "~"HEMODYNAMICS : (mmHg)"~"AO (s/d) : 111/52"~"LV (s/d) : 112/10"~"LVEDP : 23"~" "~"Coronary Angiography:"~"Dominance: Right"~"Left Main: The left main is a medium caliber vessel gives rise to left anterior descending artery and the left circumflex artery.  There is mild diffuse atherosclerotic plaque."~"Left Anterior Descending: The left anterior descending artery is occluded after the takeoff of a previously placed stent and appears unchanged with small vessel disease distally to prior angiography in 2020.  It does gives rise to 3 large septal "~"branches.  The proximal vessel is patent."~"Left Circumflex: The left circumflex is a medium caliber nondominant system that gives rise to 2 major obtuse marginal branches that are relatively large.  The circumflex takeoff has 180 degree acute bend and has diffuse 40 to 50% disease.  OM1 has "~"ostial to proximal 70% stenosis.  Midportion of OM1 has tubular up to 70 to 80% stenosis.  These lesions appear worse compared to prior angiography from 2024 but the vessel has MARGARITA-3 flow.   The distal vessel is tortuous but angiographically normal "~"with normal flow.  Previous stents from 2024 and mid circumflex to OM 2 are widely patent."~"Right Coronary: The right coronary artery is a large-caliber, dominant vessel which gives rise to right posterior descending artery and the right posterolateral system.  Proximal to mid RCA has a smooth 40 to 50% stenosis.  Moderate RCA has diffuse "~"up to 20 to 30% stenosis.  There is a focal 60 to 70% stenosis and a medium caliber posterolateral branch.  All vessels have MARGARITA-3 flow."~"LIMA to LAD: Widely patent graft and anastomosis.  The distal LAD has severe 80% small vessel disease at the apex."~" "~"SEDATION: 37 minutes of procedural sedation was utilized.  An independent medical observer was present to assist with and help manage the patient's level of consciousness and physiologic status."~"RADIATION SUMMARY:  Fluoro Time (min): 2.8, Dose (mGy): 204.68, DAP (Gy.cm2) : 14.87 "~" "~"Closure Device: Vascular band over left radial artery, 10 cc of air."~" "~"CONCLUSIONS"~"1.  Overall stable coronary artery disease when compared to angiography in 2024 with the exception of some progression in OM1 however given there is MARGARITA-3 flow noted with no stigmata of acute changes to explain atypical chest discomfort in the "~"setting of norovirus, decision was made to pursue medical therapy after discussion with my senior interventional partner, Dr. Shahid Frederick. "~"2.  Elevated LVEDP at 23 mmHg."~" "~"RECOMMENDATIONS"~"1.  Continued medical therapy for coronary artery disease."~"2.  Wean radioman per protocol."~"3.  Aggressive management of cardiovascular risk factors."~"4.  Defer management and norovirus to primary team."~" "~"Copy to: ANNA Neumann"~"Bhargavi Perez MD, [facility], [facility]"

## 2025-03-06 ENCOUNTER — TELEPHONE (OUTPATIENT)
Dept: GASTROENTEROLOGY | Facility: CLINIC | Age: 85
End: 2025-03-06

## 2025-03-06 NOTE — TELEPHONE ENCOUNTER
Jitendra Soto DO  P [facility] Clerical; Lj Sauer MD  Clerical: Can this patient please have follow up with Dr. Sauer in a few weeks? At least 10 days out (positive norovirus).

## 2025-03-14 ENCOUNTER — OFFICE VISIT (OUTPATIENT)
Dept: GASTROENTEROLOGY | Facility: CLINIC | Age: 85
End: 2025-03-14
Payer: MEDICARE

## 2025-03-14 VITALS
WEIGHT: 160.6 LBS | HEIGHT: 69 IN | SYSTOLIC BLOOD PRESSURE: 124 MMHG | DIASTOLIC BLOOD PRESSURE: 58 MMHG | BODY MASS INDEX: 23.79 KG/M2

## 2025-03-14 DIAGNOSIS — I25.10 CORONARY ARTERY DISEASE INVOLVING NATIVE CORONARY ARTERY OF NATIVE HEART WITHOUT ANGINA PECTORIS: ICD-10-CM

## 2025-03-14 DIAGNOSIS — C91.10 CLL (CHRONIC LYMPHOCYTIC LEUKEMIA) (HCC): ICD-10-CM

## 2025-03-14 DIAGNOSIS — R07.9 CHEST PAIN, UNSPECIFIED TYPE: Primary | ICD-10-CM

## 2025-03-14 DIAGNOSIS — K21.9 GASTROESOPHAGEAL REFLUX DISEASE WITHOUT ESOPHAGITIS: ICD-10-CM

## 2025-03-14 PROCEDURE — 99214 OFFICE O/P EST MOD 30 MIN: CPT | Performed by: INTERNAL MEDICINE

## 2025-03-14 RX ORDER — HYDROCORTISONE 25 MG/G
CREAM TOPICAL 4 TIMES DAILY PRN
COMMUNITY

## 2025-03-14 RX ORDER — NITROGLYCERIN 0.4 MG/1
TABLET SUBLINGUAL 3 TIMES DAILY
COMMUNITY

## 2025-03-14 RX ORDER — HYDROXYZINE HYDROCHLORIDE 25 MG/1
TABLET, FILM COATED ORAL EVERY 24 HOURS
COMMUNITY

## 2025-03-14 RX ORDER — FAMOTIDINE 20 MG/1
2 TABLET, FILM COATED ORAL 2 TIMES DAILY
COMMUNITY
Start: 2025-03-06

## 2025-03-14 NOTE — PATIENT INSTRUCTIONS
Continue pantoprazole in morning.  Okay to use famotidine at bedtime as needed when you are having a bad day from reflux/chest pain or have to take NSAIDs during the day for arthritis.  Follow-up office visit 4 months but if you have any problems reach out let me know

## 2025-03-14 NOTE — PROGRESS NOTES
Name: Kody Perea      : 1940      MRN: [id number]  Encounter Provider: Lj Sauer MD  Encounter Date: 3/14/2025   Encounter department: [facility]  :  Assessment & Plan  Chest pain, unspecified type    Atypical chest pain.  Recently hospitalized with norovirus and chest pain.  Cardiac catheterization negative.  GI related?  -Continue pantoprazole every morning.  -Transition Pepcid to 40 mg at bedtime as needed for breakthrough chest pain or need for NSAIDs       Gastroesophageal reflux disease without esophagitis  As above       Coronary artery disease involving native coronary artery of native heart without angina pectoris  Followed by [facility]       CLL (chronic lymphocytic leukemia) ([facility])  Followed by Dr. Nguyen           History of Present Illness   Kody Perea is a 84 y.o. male who presents after hospital admission.  He has a history of coronary artery disease, GERD, and CLL.  He presented with atypical chest pain.  He tested positive for norovirus.  He was taking NSAIDs at that time.  He on baseline is on pantoprazole 40 mg daily.  While in the hospital he had a cardiac catheterization that was negative.  He was started on Pepcid 20 mg twice a day.  He returns today he reports he is starting to feel better.  He denies any chest pain.  Denies any heartburn or indigestion.  Denies any dysphagia, odynophagia, early satiety.  Reports intermittent diarrhea and constipation which is his baseline.  He denies any GI bleeding.  Atypical chest pain  HPI  History obtained from: patient  Review of Systems A complete review of systems is negative other than that noted above in the HPI.    Current Outpatient Medications   Medication Sig Dispense Refill    amLODIPine (NORVASC) 5 mg tablet Take 5 mg by mouth daily      aspirin (ECOTRIN LOW STRENGTH) 81 mg EC tablet Take 81 mg by mouth daily      clopidogrel (PLAVIX) 75 mg tablet TAKE 1 TABLET (75 MG) ORALLY  "DAILY      ezetimibe (ZETIA) 10 mg tablet TAKE 1 TABLET BY MOUTH EVERY DAY for 90      famotidine (PEPCID) 20 mg tablet Take 2 tablets by mouth 2 (two) times a day      hydrocortisone 2.5 % cream Apply topically 4 (four) times a day as needed      hydrOXYzine HCL (ATARAX) 25 mg tablet every 24 hours      metoprolol succinate (TOPROL-XL) 25 mg 24 hr tablet Take 25 mg by mouth daily      nitroglycerin (NITROSTAT) 0.4 mg SL tablet 3 (three) times a day      pantoprazole (PROTONIX) 40 mg tablet Take 1 tablet (40 mg total) by mouth daily 90 tablet 5    Repatha SureClick 140 MG/ML SOAJ INJECT 140 MG SUBCUTANEOUSLY EVERY 2 WEEKS      atorvastatin (LIPITOR) 80 mg tablet Take 80 mg by mouth daily (Patient not taking: Reported on 10/16/2024)      bicalutamide (CASODEX) 50 mg tablet Take 50 mg by mouth daily (Patient not taking: Reported on 3/14/2025)      dicyclomine (BENTYL) 10 mg capsule Take 1 capsule (10 mg total) by mouth every 6 (six) hours as needed (Abdominal cramps) (Patient not taking: Reported on 10/16/2024) 30 capsule 5    escitalopram (LEXAPRO) 10 mg tablet Take 10 mg by mouth daily (Patient not taking: Reported on 10/16/2024)      isosorbide mononitrate (IMDUR) 30 mg 24 hr tablet Take 30 mg by mouth daily (Patient not taking: Reported on 3/14/2025)       No current facility-administered medications for this visit.     Objective   /58   Ht 5' 9\" (1.753 m)   Wt 72.8 kg (160 lb 9.6 oz)   BMI 23.72 kg/m²     Physical Exam General appearance: alert, appears stated age and cooperative  Eyes: PERLLA, EOMI, no icterus   Head: Normocephalic, without obvious abnormality, atraumatic  Lungs: clear to auscultation bilaterally  Heart: regular rate and rhythm, S1, S2 normal, no murmur, click, rub or gallop  Abdomen: soft, non-tender; bowel sounds normal; no masses,  no organomegaly  Extremities: extremities normal, atraumatic, no cyanosis or edema  Neurologic: Grossly normal        Lab Results: I personally reviewed " relevant lab results.

## 2025-03-14 NOTE — LETTER
2025     Tanya Shah MD  [address]  [facility] [address]    Patient: Kody Perea   YOB: 1940   Date of Visit: 3/14/2025       Dear Dr. Shah:    Thank you for referring Kody Perea to me for evaluation. Below are my notes for this consultation.    If you have questions, please do not hesitate to call me. I look forward to following your patient along with you.         Sincerely,        Lj Sauer MD        CC: MD Pilo Marques DO Michael J. Cassidy, MD  3/14/2025  2:29 PM  Sign when Signing Visit  Name: Kody Perea      : 1940      MRN: [id number]  Encounter Provider: Lj Sauer MD  Encounter Date: 3/14/2025   Encounter department: [facility]  :  Assessment & Plan  Chest pain, unspecified type    Atypical chest pain.  Recently hospitalized with norovirus and chest pain.  Cardiac catheterization negative.  GI related?  -Continue pantoprazole every morning.  -Transition Pepcid to 40 mg at bedtime as needed for breakthrough chest pain or need for NSAIDs       Gastroesophageal reflux disease without esophagitis  As above       Coronary artery disease involving native coronary artery of native heart without angina pectoris  Followed by [facility]       CLL (chronic lymphocytic leukemia) (HCC)  Followed by Dr. Nguyen           History of Present Illness  Kody Perea is a 84 y.o. male who presents after hospital admission.  He has a history of coronary artery disease, GERD, and CLL.  He presented with atypical chest pain.  He tested positive for norovirus.  He was taking NSAIDs at that time.  He on baseline is on pantoprazole 40 mg daily.  While in the hospital he had a cardiac catheterization that was negative.  He was started on Pepcid 20 mg twice a day.  He returns today he reports he is starting to feel better.  He denies any chest pain.  Denies any heartburn or indigestion.   Denies any dysphagia, odynophagia, early satiety.  Reports intermittent diarrhea and constipation which is his baseline.  He denies any GI bleeding.  Atypical chest pain  HPI  History obtained from: patient  Review of Systems A complete review of systems is negative other than that noted above in the HPI.    Current Outpatient Medications   Medication Sig Dispense Refill   • amLODIPine (NORVASC) 5 mg tablet Take 5 mg by mouth daily     • aspirin (ECOTRIN LOW STRENGTH) 81 mg EC tablet Take 81 mg by mouth daily     • clopidogrel (PLAVIX) 75 mg tablet TAKE 1 TABLET (75 MG) ORALLY DAILY     • ezetimibe (ZETIA) 10 mg tablet TAKE 1 TABLET BY MOUTH EVERY DAY for 90     • famotidine (PEPCID) 20 mg tablet Take 2 tablets by mouth 2 (two) times a day     • hydrocortisone 2.5 % cream Apply topically 4 (four) times a day as needed     • hydrOXYzine HCL (ATARAX) 25 mg tablet every 24 hours     • metoprolol succinate (TOPROL-XL) 25 mg 24 hr tablet Take 25 mg by mouth daily     • nitroglycerin (NITROSTAT) 0.4 mg SL tablet 3 (three) times a day     • pantoprazole (PROTONIX) 40 mg tablet Take 1 tablet (40 mg total) by mouth daily 90 tablet 5   • Repatha SureClick 140 MG/ML SOAJ INJECT 140 MG SUBCUTANEOUSLY EVERY 2 WEEKS     • atorvastatin (LIPITOR) 80 mg tablet Take 80 mg by mouth daily (Patient not taking: Reported on 10/16/2024)     • bicalutamide (CASODEX) 50 mg tablet Take 50 mg by mouth daily (Patient not taking: Reported on 3/14/2025)     • dicyclomine (BENTYL) 10 mg capsule Take 1 capsule (10 mg total) by mouth every 6 (six) hours as needed (Abdominal cramps) (Patient not taking: Reported on 10/16/2024) 30 capsule 5   • escitalopram (LEXAPRO) 10 mg tablet Take 10 mg by mouth daily (Patient not taking: Reported on 10/16/2024)     • isosorbide mononitrate (IMDUR) 30 mg 24 hr tablet Take 30 mg by mouth daily (Patient not taking: Reported on 3/14/2025)       No current facility-administered medications for this visit.  "    Objective  /58   Ht 5' 9\" (1.753 m)   Wt 72.8 kg (160 lb 9.6 oz)   BMI 23.72 kg/m²     Physical Exam General appearance: alert, appears stated age and cooperative  Eyes: PERLLA, EOMI, no icterus   Head: Normocephalic, without obvious abnormality, atraumatic  Lungs: clear to auscultation bilaterally  Heart: regular rate and rhythm, S1, S2 normal, no murmur, click, rub or gallop  Abdomen: soft, non-tender; bowel sounds normal; no masses,  no organomegaly  Extremities: extremities normal, atraumatic, no cyanosis or edema  Neurologic: Grossly normal        Lab Results: I personally reviewed relevant lab results.             "

## 2025-07-25 ENCOUNTER — OFFICE VISIT (OUTPATIENT)
Dept: GASTROENTEROLOGY | Facility: CLINIC | Age: 85
End: 2025-07-25
Payer: MEDICARE

## 2025-07-25 VITALS
DIASTOLIC BLOOD PRESSURE: 64 MMHG | SYSTOLIC BLOOD PRESSURE: 118 MMHG | BODY MASS INDEX: 22.22 KG/M2 | WEIGHT: 150 LBS | HEIGHT: 69 IN

## 2025-07-25 DIAGNOSIS — K58.0 IRRITABLE BOWEL SYNDROME WITH DIARRHEA: ICD-10-CM

## 2025-07-25 DIAGNOSIS — K21.9 GASTROESOPHAGEAL REFLUX DISEASE WITHOUT ESOPHAGITIS: Primary | ICD-10-CM

## 2025-07-25 DIAGNOSIS — I63.232 CEREBROVASCULAR ACCIDENT (CVA) DUE TO STENOSIS OF LEFT CAROTID ARTERY (HCC): ICD-10-CM

## 2025-07-25 PROCEDURE — 99214 OFFICE O/P EST MOD 30 MIN: CPT | Performed by: INTERNAL MEDICINE

## 2025-07-25 RX ORDER — DICYCLOMINE HYDROCHLORIDE 10 MG/1
10 CAPSULE ORAL EVERY 6 HOURS PRN
Qty: 60 CAPSULE | Refills: 5 | Status: SHIPPED | OUTPATIENT
Start: 2025-07-25

## 2025-07-25 RX ORDER — FAMOTIDINE 40 MG/1
40 TABLET, FILM COATED ORAL EVERY 12 HOURS
COMMUNITY

## 2025-07-25 NOTE — PATIENT INSTRUCTIONS
Continue Prilosec in the morning and Pepcid at bedtime.  Take Bentyl twice a day until you are feeling better and then just use as needed.  Start fiber supplementation daily.  Okay to use Imodium prophylactically if you are going out and the bathroom will be readily available or you know you are going to eat.  Follow-up office visit 6 months but reach out to me if you are having problems

## 2025-07-25 NOTE — ASSESSMENT & PLAN NOTE
Intermittent bouts of diarrhea over the last several weeks.  No obvious food trigger.  Continues to be under a lot of stress caring for his wife  -Start fiber supplementation daily  -Use dicyclomine aggressively as needed  -Okay to use Imodium preemptively if he is going out to eat or traveling where he will not have easy access to a bathroom  Orders:    dicyclomine (BENTYL) 10 mg capsule; Take 1 capsule (10 mg total) by mouth every 6 (six) hours as needed (Abdominal cramps)

## 2025-07-25 NOTE — PROGRESS NOTES
Name: Kody Perea      : 1940      MRN: [id number]  Encounter Provider: Lj Sauer MD  Encounter Date: 2025   Encounter department: [facility]  :  Assessment & Plan  Irritable bowel syndrome with diarrhea  Intermittent bouts of diarrhea over the last several weeks.  No obvious food trigger.  Continues to be under a lot of stress caring for his wife  -Start fiber supplementation daily  -Use dicyclomine aggressively as needed  -Okay to use Imodium preemptively if he is going out to eat or traveling where he will not have easy access to a bathroom  Orders:    dicyclomine (BENTYL) 10 mg capsule; Take 1 capsule (10 mg total) by mouth every 6 (six) hours as needed (Abdominal cramps)    Gastroesophageal reflux disease without esophagitis  Doing well on Protonix in the morning and Pepcid at bedtime       Cerebrovascular accident (CVA) due to stenosis of left carotid artery (HCC)  S/P CEA 2025           History of Present Illness   Kody Perea is a 85 y.o. male who presents for follow-up.  Since last time I have seen him he had a CVA and underwent a left carotid endarterectomy.  He has fully recovered neurologically.  He is now on Plavix once a day.  From a GERD standpoint he is doing well.  He continues take Protonix 40 mg in the morning.  He consistently takes Pepcid at bedtime.  From an upper GI standpoint he is doing well.  What is been more troubling over the last several weeks has had issues of diarrhea.  He reports that for 5 times a week he will get rumbling in his stomach and then have a large bowel movement.  He denies any fecal incontinence.  Denies any rectal bleeding.  It usually occurs after eating but he has not been able to identify a food trigger.  He is not on any real new medications.  His weight is stable though.  HPI  History obtained from: patient  Review of Systems A complete review of systems is negative other than that noted  "above in the HPI.         Objective   /64   Ht 5' 9\" (1.753 m)   Wt 68 kg (150 lb)   BMI 22.15 kg/m²     Physical Exam   General appearance: alert, appears stated age and cooperative  Eyes: PERLLA, EOMI, no icterus   Head: Normocephalic, without obvious abnormality, atraumatic  Lungs: clear to auscultation bilaterally  Heart: regular rate and rhythm, S1, S2 normal, no murmur, click, rub or gallop  Abdomen: soft, non-tender; bowel sounds normal; no masses,  no organomegaly  Extremities: extremities normal, atraumatic, no cyanosis or edema  Neurologic: Grossly normal        Lab Results: I personally reviewed relevant lab results.         "

## 2025-07-25 NOTE — LETTER
2025     Tanya Shah MD  [address]  [facility] [address]    Patient: Kody Perea   YOB: 1940   Date of Visit: 2025       Dear MD Chris Desai MD:    Thank you for referring Kody Perea to me for evaluation. Below are my notes for this consultation.    If you have questions, please do not hesitate to call me. I look forward to following your patient along with you.         Sincerely,        Lj Sauer MD        CC: MD Lj Wright MD  2025  2:34 PM  Sign when Signing Visit  Name: Kody Perea      : 1940      MRN: [id number]  Encounter Provider: Lj Sauer MD  Encounter Date: 2025   [facility]  :  Assessment & Plan  Irritable bowel syndrome with diarrhea  Intermittent bouts of diarrhea over the last several weeks.  No obvious food trigger.  Continues to be under a lot of stress caring for his wife  -Start fiber supplementation daily  -Use dicyclomine aggressively as needed  -Okay to use Imodium preemptively if he is going out to eat or traveling where he will not have easy access to a bathroom  Orders:  •  dicyclomine (BENTYL) 10 mg capsule; Take 1 capsule (10 mg total) by mouth every 6 (six) hours as needed (Abdominal cramps)    Gastroesophageal reflux disease without esophagitis  Doing well on Protonix in the morning and Pepcid at bedtime       Cerebrovascular accident (CVA) due to stenosis of left carotid artery (HCC)  S/P CEA 2025           History of Present Illness  Kody Perea is a 85 y.o. male who presents for follow-up.  Since last time I have seen him he had a CVA and underwent a left carotid endarterectomy.  He has fully recovered neurologically.  He is now on Plavix once a day.  From a GERD standpoint he is doing well.  He continues take Protonix 40 mg in the morning.  He consistently takes Pepcid at bedtime.   "From an upper GI standpoint he is doing well.  What is been more troubling over the last several weeks has had issues of diarrhea.  He reports that for 5 times a week he will get rumbling in his stomach and then have a large bowel movement.  He denies any fecal incontinence.  Denies any rectal bleeding.  It usually occurs after eating but he has not been able to identify a food trigger.  He is not on any real new medications.  His weight is stable though.  HPI  History obtained from: patient  Review of Systems A complete review of systems is negative other than that noted above in the HPI.         Objective  /64   Ht 5' 9\" (1.753 m)   Wt 68 kg (150 lb)   BMI 22.15 kg/m²     Physical Exam   General appearance: alert, appears stated age and cooperative  Eyes: PERLLA, EOMI, no icterus   Head: Normocephalic, without obvious abnormality, atraumatic  Lungs: clear to auscultation bilaterally  Heart: regular rate and rhythm, S1, S2 normal, no murmur, click, rub or gallop  Abdomen: soft, non-tender; bowel sounds normal; no masses,  no organomegaly  Extremities: extremities normal, atraumatic, no cyanosis or edema  Neurologic: Grossly normal        Lab Results: I personally reviewed relevant lab results.         "

## 2025-08-03 DIAGNOSIS — K58.0 IRRITABLE BOWEL SYNDROME WITH DIARRHEA: ICD-10-CM

## 2025-08-04 RX ORDER — DICYCLOMINE HYDROCHLORIDE 10 MG/1
10 CAPSULE ORAL EVERY 6 HOURS PRN
Qty: 360 CAPSULE | Refills: 1 | Status: SHIPPED | OUTPATIENT
Start: 2025-08-04